# Patient Record
Sex: FEMALE | Race: WHITE | Employment: OTHER | URBAN - METROPOLITAN AREA
[De-identification: names, ages, dates, MRNs, and addresses within clinical notes are randomized per-mention and may not be internally consistent; named-entity substitution may affect disease eponyms.]

---

## 2017-08-29 ENCOUNTER — TRANSFERRED RECORDS (OUTPATIENT)
Dept: HEALTH INFORMATION MANAGEMENT | Facility: CLINIC | Age: 57
End: 2017-08-29

## 2017-09-03 ENCOUNTER — TRANSFERRED RECORDS (OUTPATIENT)
Dept: HEALTH INFORMATION MANAGEMENT | Facility: CLINIC | Age: 57
End: 2017-09-03

## 2017-09-06 ENCOUNTER — TRANSFERRED RECORDS (OUTPATIENT)
Dept: HEALTH INFORMATION MANAGEMENT | Facility: CLINIC | Age: 57
End: 2017-09-06

## 2017-09-09 ENCOUNTER — TRANSFERRED RECORDS (OUTPATIENT)
Dept: HEALTH INFORMATION MANAGEMENT | Facility: CLINIC | Age: 57
End: 2017-09-09

## 2017-09-10 ENCOUNTER — TRANSFERRED RECORDS (OUTPATIENT)
Dept: HEALTH INFORMATION MANAGEMENT | Facility: CLINIC | Age: 57
End: 2017-09-10

## 2017-09-11 ENCOUNTER — TRANSFERRED RECORDS (OUTPATIENT)
Dept: HEALTH INFORMATION MANAGEMENT | Facility: CLINIC | Age: 57
End: 2017-09-11

## 2017-09-12 ENCOUNTER — TRANSFERRED RECORDS (OUTPATIENT)
Dept: HEALTH INFORMATION MANAGEMENT | Facility: CLINIC | Age: 57
End: 2017-09-12

## 2017-09-13 ENCOUNTER — TRANSFERRED RECORDS (OUTPATIENT)
Dept: HEALTH INFORMATION MANAGEMENT | Facility: CLINIC | Age: 57
End: 2017-09-13

## 2017-09-14 ENCOUNTER — TRANSFERRED RECORDS (OUTPATIENT)
Dept: HEALTH INFORMATION MANAGEMENT | Facility: CLINIC | Age: 57
End: 2017-09-14

## 2017-09-15 ENCOUNTER — TRANSFERRED RECORDS (OUTPATIENT)
Dept: HEALTH INFORMATION MANAGEMENT | Facility: CLINIC | Age: 57
End: 2017-09-15

## 2017-09-16 ENCOUNTER — TRANSFERRED RECORDS (OUTPATIENT)
Dept: HEALTH INFORMATION MANAGEMENT | Facility: CLINIC | Age: 57
End: 2017-09-16

## 2017-09-18 ENCOUNTER — TRANSFERRED RECORDS (OUTPATIENT)
Dept: HEALTH INFORMATION MANAGEMENT | Facility: CLINIC | Age: 57
End: 2017-09-18

## 2018-01-27 ENCOUNTER — HEALTH MAINTENANCE LETTER (OUTPATIENT)
Age: 58
End: 2018-01-27

## 2018-01-29 ENCOUNTER — PRE VISIT (OUTPATIENT)
Dept: NEUROSURGERY | Facility: CLINIC | Age: 58
End: 2018-01-29

## 2018-01-29 NOTE — TELEPHONE ENCOUNTER
"NEUROSURGERY PRE-VISIT DATA  NEUROSURGERY PRE-VISIT DATA  ON THE PHONE CALL     Date of call   1/26/2018   Date of appointment   2/14/2018   Reason for referral (match appointment comment) Left Trigeminal Neuralgia a/w MS to thrive.   Who made the initial call (patient, parent [name], referral source     patient   Name of referring provider  Dr. Gertrudis Perez   Has the patient been seen for the referring problem?   If yes, by whom and where    yes/ [facility]   Where and what tests have been done?        Previous surgeries for the  referred condition\"?  If yes, by whom and where  *Request operative reports(s).      REQUEST MEDICAL RECORDS     From where?  [facility]   From whom (Specify Primary, Neurology, Neurosurgery, Orthopedics, ENT, DDS, Pain Mgt, Medical Oncologist, Radiation Oncologist)       Date of request ?    Who did you speak to?    Are records already in [facility]?     Have pertinent, outside records received?  (OR notes, study reports and provider notes)      IMAGES and TESTS:     Has the patient had any images done? yes   What images? (Specify MRI, CT, X-ray, Bone scan, MRA/MRV, etc)  * Include When and where?  MRI   What tests? (EMG, EEG, lumbar puncture, etc)  *Include when and where      Are the images in PACS?    If not already in PACS, have images been pushed to PAC and when?        "

## 2018-01-31 DIAGNOSIS — G35 MULTIPLE SCLEROSIS (H): ICD-10-CM

## 2018-01-31 DIAGNOSIS — G50.0 SECONDARY TRIGEMINAL NEURALGIA: Primary | ICD-10-CM

## 2018-02-14 ENCOUNTER — APPOINTMENT (OUTPATIENT)
Dept: SURGERY | Facility: CLINIC | Age: 58
End: 2018-02-14
Payer: COMMERCIAL

## 2018-02-14 ENCOUNTER — OFFICE VISIT (OUTPATIENT)
Dept: NEUROSURGERY | Facility: CLINIC | Age: 58
End: 2018-02-14
Payer: COMMERCIAL

## 2018-02-14 ENCOUNTER — ALLIED HEALTH/NURSE VISIT (OUTPATIENT)
Dept: SURGERY | Facility: CLINIC | Age: 58
End: 2018-02-14
Payer: COMMERCIAL

## 2018-02-14 ENCOUNTER — OFFICE VISIT (OUTPATIENT)
Dept: SURGERY | Facility: CLINIC | Age: 58
End: 2018-02-14
Payer: COMMERCIAL

## 2018-02-14 ENCOUNTER — ANESTHESIA EVENT (OUTPATIENT)
Dept: SURGERY | Facility: CLINIC | Age: 58
End: 2018-02-14
Payer: COMMERCIAL

## 2018-02-14 VITALS
WEIGHT: 118.1 LBS | OXYGEN SATURATION: 98 % | SYSTOLIC BLOOD PRESSURE: 111 MMHG | RESPIRATION RATE: 20 BRPM | BODY MASS INDEX: 18.54 KG/M2 | HEIGHT: 67 IN | TEMPERATURE: 97.9 F | DIASTOLIC BLOOD PRESSURE: 75 MMHG | HEART RATE: 92 BPM

## 2018-02-14 VITALS
WEIGHT: 118.1 LBS | BODY MASS INDEX: 18.54 KG/M2 | SYSTOLIC BLOOD PRESSURE: 111 MMHG | DIASTOLIC BLOOD PRESSURE: 75 MMHG | HEART RATE: 92 BPM | OXYGEN SATURATION: 98 % | HEIGHT: 67 IN | TEMPERATURE: 97.9 F | RESPIRATION RATE: 20 BRPM

## 2018-02-14 DIAGNOSIS — G50.0 TRIGEMINAL NEURALGIA: ICD-10-CM

## 2018-02-14 DIAGNOSIS — Z01.818 PREOPERATIVE GENERAL PHYSICAL EXAMINATION: Primary | ICD-10-CM

## 2018-02-14 DIAGNOSIS — G35 MULTIPLE SCLEROSIS (H): ICD-10-CM

## 2018-02-14 DIAGNOSIS — G50.0 SECONDARY TRIGEMINAL NEURALGIA: Primary | ICD-10-CM

## 2018-02-14 DIAGNOSIS — G35 MS (MULTIPLE SCLEROSIS) (H): ICD-10-CM

## 2018-02-14 RX ORDER — AMITRIPTYLINE HYDROCHLORIDE 50 MG/1
50 TABLET ORAL AT BEDTIME
COMMUNITY

## 2018-02-14 RX ORDER — GABAPENTIN 100 MG/1
100 CAPSULE ORAL 2 TIMES DAILY
COMMUNITY

## 2018-02-14 RX ORDER — LEVETIRACETAM 500 MG/1
500 TABLET ORAL 2 TIMES DAILY
COMMUNITY

## 2018-02-14 RX ORDER — AMITRIPTYLINE HYDROCHLORIDE 10 MG/1
10 TABLET ORAL AT BEDTIME
COMMUNITY

## 2018-02-14 RX ORDER — MEMANTINE HYDROCHLORIDE 10 MG/1
10 TABLET ORAL 2 TIMES DAILY
COMMUNITY

## 2018-02-14 RX ORDER — BACLOFEN 10 MG/1
10 TABLET ORAL 4 TIMES DAILY
COMMUNITY

## 2018-02-14 RX ORDER — VENLAFAXINE HYDROCHLORIDE 225 MG/1
225 TABLET, EXTENDED RELEASE ORAL
COMMUNITY

## 2018-02-14 ASSESSMENT — PAIN SCALES - GENERAL: PAINLEVEL: NO PAIN (0)

## 2018-02-14 NOTE — NURSING NOTE
Chief Complaint   Patient presents with     Consult     [facility]- LEFT SIDED FACIAL PAIN     David De Guzman, CMA

## 2018-02-14 NOTE — MR AVS SNAPSHOT
After Visit Summary   2018    Slime Valle    MRN: [id number]           Patient Information     Date Of Birth          1960        Visit Information        Provider Department      2018 2:30 PM Rn, [facility]        Care Instructions    Preparing for Your Surgery      Name:  Slime Valle   MRN:  [id number]   :  1960   Today's Date:  2018     Arriving for surgery:  Surgery date:  2-15-18  Arrival time:  05:30 a.m.  Please come to:       [facility] [address]  [address] [place]  [address]    -   parking is available in front of the hospital from 5:15 am to 8:00 pm    -  Stop at the Information Desk in the lobby    -   Inform the information person that you are here for surgery. An escort to 3c will be provided. If you would not like an escort, please proceed to 3C on the 3rd floor. [phone]     What can I eat or drink?  -  You may have solid food or milk products until 8 hours prior to your surgery  11:00 p.m.  -  You may have water, apple juice or 7up/Sprite until 2 hours prior to your surgery 05:30 a.m.    Which medicines can I take?  -  Do NOT take these medications in the morning, the day of surgery:     Vitamin D    -  Please take these medications the day of surgery:     Keppra, Gabapentin, Baclofen, Effexor, Namenda    How do I prepare myself?  -  Take two showers: one the night before surgery; and one the morning of surgery.         Use Scrubcare or Hibiclens to wash from neck down.  You may use your own shampoo and conditioner. No other hair products.   -  Do NOT use lotion, powder, deodorant, or antiperspirant the day of your surgery.  -  Do NOT wear any makeup, fingernail polish or jewelry.  -Do not bring your own medications to the hospital, except for inhalers and eye drops.  -  Bring your ID and insurance card.    Questions or Concerns:  If you have questions  or concerns, please call the  Preoperative Assessment Center, Monday-Friday 7AM-7PM:  [phone]  AFTER YOUR SURGERY  Breathing exercises   Breathing exercises help you recover faster. Take deep breaths and let the air out slowly. This will:     Help you wake up after surgery.    Help prevent complications like pneumonia.  Preventing complications will help you go home sooner.   We may give you a breathing device (incentive spirometer) to encourage you to breathe deeply.   Nausea and vomiting   You may feel sick to your stomach after surgery; if so, let your nurse know.    Pain control:  After surgery, you may have pain. Our goal is to help you manage your pain. Pain medicine will help you feel comfortable enough to do activities that will help you heal.  These activities may include breathing exercises, walking and physical therapy.   To help your health care team treat your pain we will ask: 1) If you have pain  2) where it is located 3) describe your pain in your words  Methods of pain control include medications given by mouth, vein or by nerve block for some surgeries.  We may give you a pain control pump that will:  1) Deliver the medicine through a tube placed in your vein  2) Control the amount of medicine you receive  3) Allow you to push a button to deliver a dose of pain medicine  Sequential Compression Device (SCD) or Pneumo Boots:  You may need to wear SCD S on your legs or feet. These are wraps connected to a machine that pumps in air and releases it. The repeated pumping helps prevent blood clots from forming.           Follow-ups after your visit        Your next 10 appointments already scheduled     Feb 14, 2018  2:30 PM CST   (Arrive by 2:15 PM)   PAC RN ASSESSMENT with Jasmin Pac Rn   [facility] ([facility])    [address] [place] [id number] [phone]            Feb 14, 2018  3:00 PM CST   (Arrive by 2:45 PM)    PAC Anesthesia Consult with  Pac Anesthesiologist   [facility] Pre[facility] ([facility])    [address]  [address] [facility] [id number] [phone]            2018  3:15 PM CST   LAB with  LAB   [facility] Lab ([facility])    [address]  [address] [facility] [id number] [phone]           Please do not eat 10-12 hours before your appointment if you are coming in fasting for labs on lipids, cholesterol, or glucose (sugar). This does not apply to pregnant women. Water, hot tea and black coffee (with nothing added) are okay. Do not drink other fluids, diet soda or chew gum.            Feb 15, 2018   Procedure with Gene Mcclelland MD   [facility] [place] Same Day Surgery (--)    [address] [id number] [phone]              Who to contact     Please call your clinic at [phone] to:    Ask questions about your health    Make or cancel appointments    Discuss your medicines    Learn about your test results    Speak to your doctor            Additional Information About Your Visit        [employer] Information     IMANINt is an electronic gateway that provides easy, online access to your medical records. With [employer], you can request a clinic appointment, read your test results, renew a prescription or communicate with your care team.     To sign up for [employer] visit the website at [email or web address]   You will be asked to enter the access code listed below, as well as some personal information. Please follow the directions to create your username and password.     Your access code is: [email or web address]  Expires: 2018 12:29 PM     Your access code will  in 90 days. If you need help or a new code, please contact your [facility] or call [phone] for assistance.        Care EveryWhere ID     This is your Care EveryWhere ID. This could be used  by other organizations to access your [facility] medical records  [id number]         Blood Pressure from Last 3 Encounters:   02/14/18 111/75   02/14/18 111/75    Weight from Last 3 Encounters:   02/14/18 53.6 kg (118 lb 1.6 oz)   02/14/18 53.6 kg (118 lb 1.6 oz)              Today, you had the following     No orders found for display       Primary Care Provider Office Phone # Fax #    Gertrudis GONZALEZ Chris, ZANE [phone]       [facility]  [address]  [facility] [address]        Equal Access to Services     [facility]: Hadii aad ku hadasho Sorosario, waaxda luqadaha, qaybta kaalmada aderenuyada, melva rodrigues . So [facility] [phone].    ATENCIÓN: Si habla español, tiene a hammonds disposición servicios gratuitos de asistencia lingüística. [facility] [phone].    We comply with applicable federal civil rights laws and [place] laws. We do not discriminate on the basis of race, color, national origin, age, disability, sex, sexual orientation, or gender identity.            Thank you!     Thank you for choosing [facility]  for your care. Our goal is always to provide you with excellent care. Hearing back from our patients is one way we can continue to improve our services. Please take a few minutes to complete the written survey that you may receive in the mail after your visit with us. Thank you!             Your Updated Medication List - Protect others around you: Learn how to safely use, store and throw away your medicines at www.disposemymeds.org.          This list is accurate as of 2/14/18  2:08 PM.  Always use your most recent med list.                   Brand Name Dispense Instructions for use Diagnosis    * amitriptyline 10 MG tablet    ELAVIL     Take 10 mg by mouth At Bedtime        * amitriptyline 50 MG tablet    ELAVIL     Take 50 mg by mouth At Bedtime        baclofen 10 MG tablet    LIORESAL     Take 10 mg by mouth 3 times  daily 20 mg in the morning, 10 mg at supper, and 10 mg at bedtime        cholecalciferol 1000 UNIT tablet    vitamin D3     Take 1,000 Units by mouth 2 times daily        gabapentin 100 MG capsule    NEURONTIN     Take 100 mg by mouth 2 times daily        Interferon Beta-1a 44 MCG/0.5ML Soaj      Inject 44 mcg into the muscle three times a week        KEPPRA 500 MG tablet   Generic drug:  levETIRAcetam      Take 500 mg by mouth 2 times daily        memantine 10 MG tablet    NAMENDA     Take 10 mg by mouth 2 times daily        PHENYTOIN PO      Take 300 mg by mouth At Bedtime        venlafaxine 225 MG Tb24 24 hr tablet    EFFEXOR-ER     Take 225 mg by mouth daily (with breakfast)        * Notice:  This list has 2 medication(s) that are the same as other medications prescribed for you. Read the directions carefully, and ask your doctor or other care provider to review them with you.

## 2018-02-14 NOTE — H&P
Pre-Operative H & P     CC:  Preoperative exam to assess for increased cardiopulmonary risk while undergoing surgery and anesthesia.    Date of Encounter: 2/14/2018  Primary Care Physician:  Gertrudis Perez  Slime Valle is a 57 year old female who presents for pre-operative H & P in preparation for undergo radiofrequency percutaneous left trigeminal rhizotomy, with Dr. Gene Mcclelland, on 2/15/18, at [facility], in treatment of trigeminal neuralgia. She is on multiple medications for pain, as well as steroids with poor response. In September a PEG tube was placed to facilitate nutrition due to difficulty with chewing.  She also has multiple sclerosis with complications of imbalance and uses a cane or walker.     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 Patient is postmenopausal.    Steroid use in the last year: yes for trigeminal neuralgia-predpack    Personal or FH with difficulty with Anesthesia:  Oversedation and hypoxia at OSH during intervention for pain control    Prior to Admission Medications  Current Outpatient Prescriptions   Medication Sig Dispense Refill     Interferon Beta-1a 44 MCG/0.5ML SOAJ Inject 44 mcg into the muscle three times a week       levETIRAcetam (KEPPRA) 500 MG tablet Take 500 mg by mouth 2 times daily       PHENYTOIN PO Take 300 mg by mouth At Bedtime        cholecalciferol (VITAMIN D3) 1000 UNIT tablet Take 1,000 Units by mouth 2 times daily        gabapentin (NEURONTIN) 100 MG capsule Take 100  mg by mouth 2 times daily       baclofen (LIORESAL) 10 MG tablet Take 10 mg by mouth 3 times daily 20 mg in the morning, 10 mg at supper, and 10 mg at bedtime       amitriptyline (ELAVIL) 10 MG tablet Take 10 mg by mouth At Bedtime       amitriptyline (ELAVIL) 50 MG tablet Take 50 mg by mouth At Bedtime       venlafaxine (EFFEXOR-ER) 225 MG TB24 24 hr tablet Take 225 mg by mouth daily (with breakfast)       memantine (NAMENDA) 10 MG tablet Take 10 mg by mouth 2 times daily         Allergies  Allergies   Allergen Reactions     Carbamazepine Other (See Comments)     Other reaction(s): Other (Specify in Comments)  Hallucinations, extreme weakness, confusion     Hydrocodone-Acetaminophen      Other reaction(s): Other (Specify in Comments)  Weakness, falls       Social History  Social History     Social History     Marital status:      Spouse name: Marvin     Number of children: 3 healthy     Years of education: N/A     Occupational History     Not on file.     Social History Main Topics     Smoking status: Never Smoker     Smokeless tobacco: Never Used     Alcohol use No     Drug use: No     Sexual activity: Not Currently     Partners: Male     Birth control/ protection: None     Other Topics Concern     Parent/Sibling W/ Cabg, Mi Or Angioplasty Before 65f 55m? No     Family History  Mother  2010: blood clot  Father: alive- no knowledge of his history      ROS/MED HX    ENT/Pulmonary:  - neg pulmonary ROS     Neurologic:     (+)other neuro relapsing remitting MS [id number] on interferon injections and  multiple other neuro/pain meds    Cardiovascular:  - neg cardiovascular ROS       METS/Exercise Tolerance:  3 - Able to walk 1-2 blocks without stopping   Hematologic:     (+) Anemia     Musculoskeletal:  - neg musculoskeletal ROS       GI/Hepatic:  - neg GI/hepatic ROS   (+) Other GI/Hepatic G-tube in past       Renal/Genitourinary:  - ROS Renal section negative       Endo:  - neg endo ROS       Psychiatric:   "   (+) psychiatric history depression      Infectious Disease:  - neg infectious disease ROS       Malignancy:      - no malignancy   Other:    (+) No chance of pregnancy no H/O Chronic Pain,no other significant disability          The complete review of systems is negative other than noted in the HPI or here.   Temp: 97.9  F (36.6  C) Temp src: Oral BP: 111/75 Pulse: 92   Resp: 20 SpO2: 98 %         118 lbs 1.6 oz  5' 7\"   Body mass index is 18.5 kg/(m^2).       Physical Exam  Constitutional: Awake, alert, cooperative, in apparent distress with wincing, holding head still and avoiding talking.  Appears stated age. Thin and frail appearance  Eyes: Pupils equal, round and reactive to light, extra ocular muscles intact, sclera clear, conjunctiva normal.  HENT: Normocephalic, oral pharynx with moist mucus membranes, inability to open mouth fully. No goiter appreciated.   Respiratory: Clear to auscultation bilaterally, no crackles or wheezing.  Cardiovascular: Regular rate and rhythm, normal S1 and S2, and no murmur noted.  Carotids +2, no bruits. No LE edema. Palpable pulses to radial  DP and PT arteries.   GI: Normal bowel sounds, soft, non-distended, non-tender, no masses palpated, no hepatosplenomegaly.  Surgical scars: left upper quad  Lymph/Hematologic: No cervical lymphadenopathy and no supraclavicular lymphadenopathy.   Genitourinary:  deferred  Skin: Warm and dry.  No rashes at anticipated surgical site.   Musculoskeletal: Loss muscle mass supraclavicular area. Full ROM of neck. There is no redness, warmth, or swelling of the joints. Gross motor strength is not tested.  Neurologic: Awake, alert, oriented to name, place and time. Cranial nerves II-XII are grossly intact. Gait is staggered and requires assistance for balance.   Neuropsychiatric: Calm, cooperative. Gratiot affect.     Labs: (personally reviewed) from 9/18/17  CBC OSH: wbc 6.3, HGB 10.8, platelets 308,000  BMP: NA+ 140, K+ 3.5, Cr 0.4, glucose " 96  INR 1.0  EKG: Not indicated    Outside records reviewed from: [facility]    ASSESSMENT and PLAN  Slime Valle is a 57 year old female scheduled to undergo radiofrequency percutaneous left trigeminal rhizotomy, with Dr. Gene Mcclelland, on 2/15/18, in treatment of trigeminal neuralgia.     Pre-operative considerations include:  1.) Cardiac: No known cardiac dx, sx or meds.  Exercise tolerance < 4 METS due to imbalance from multiple sclerosis. Needs assist for ambulation - walker or cane. Risks: HLD.   RCRI = 0.4% risk of adverse cardiac event  No further cardiac evaluation indicated    2.) Pulmonary: Her  gives hx of central sleep apnea diagnosed during admission of pain control and what sounds like overdose of pain medication, including the use of Fentanyl.     3.) GI/Heme: FTT due to pain with eating; PEG placed 9/6/17 (now closed) with improvement. Anemia. Last HGB September 10.8. No iron supplementation. Currently tolerating oral nutrition/ Boost.     4.) Neuro: relapsing remitting multiple sclerosis. RLS. Sx of imbalance. Depression. Management on baclofen, amitriptyline, neurontin, keppra, dilantin, interferon, effexor and amenda  -May take all of pain meds DOS    .. She has the following specific operative considerations:   - RCRI : No serious cardiac risks.  0.4% risk of major adverse cardiac event.   - Anesthesia considerations:  Refer to PAC assessment in anesthesia records  - ESAU # of risks 1/8 = low  - Risk of PONV score = 2.  If > 2, anti-emetic intervention recommended.      Patient was discussed with Dr Sol. Patient is optimized and is acceptable candidate for the proposed procedure.  No further diagnostic evaluation is needed.     JACKI Hartman  Preoperative Assessment Center  [facility]  Clinic and Surgery Center  Phone: [phone]  Fax: [phone]

## 2018-02-14 NOTE — LETTER
2/14/2018       RE: Slime Valle  [address] [place] [place] [address]     Dear Colleague,    Thank you for referring your patient, Slime Valle, to the [facility] NEUROSURGERY at [facility]. Please see a copy of my visit note below.    Neurosurgery Consultation    Slime Valle MRN# [id number]   YOB: 1960 Age: 57 year old   Date of Service: 02/14/18     Provider Requesting Consultation:   Gertrudis Perez NP  [facility]  [address]  [place] [address]    Dear Ms. Perez,    We had the pleasure of seeing Slime Valle and her  in the office at your request in consultation regarding her intractable well established secondary left sided trigeminal neuralgia (TN) associated with multiple sclerosis (MS).    This is a 57 year old female with a history of MS diagnosed in 1996 (on Rebif) and associated mild left sided weakness and right sided trigeminal neuralgia. She initially developed paroxysmal lancinating pains in the left V2 and V3 distributions in 12/2014. She denies constant background aching pain, dysesthesia, facial numbness and pain in the contralateral side of her face. Triggers include talking, eating, cold temperature, and touching face, although she gets the pain attacks sporadically as well.  Her trigeminal neuralgia pain was under reasonable control by Elavil until Summer of 2017. Since then, the pain has been unremitting to additional medications including Neurontin, Keppra, and Dilantin. In 9/2017, she was admitted to a local hospital and had to have a PEG tube placed for malnutrition secondary to malignant trigeminal neuralgia attacks. She had the first trigeminal nerve block on 9/14/2017 which in combination with all medications rendered about 4 months of complete pain relief. She then underwent a 2nd trigeminal nerve block on 1/13/2018 for recurrence that offered only one week of pain relief. She  no longer requires PEG tube for oral intake. The pain however, is progressing steadily despite increased dosage of medications by her neurologist. She is here to seek surgical intervention at this point.             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           Past Surgical History:   Denies.           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 Prior this, worked as a  in [place] for many years     Social History Main Topics     Smoking status: Never Smoker     Smokeless tobacco: Never Used     Alcohol use No     Drug use: No     Sexual activity: Not Currently     Partners: Male     Birth control/ protection: None     Other Topics Concern     Parent/Sibling W/ Cabg, Mi Or Angioplasty Before 65f 55m? No     Social History Narrative    Enjoys sudoku and quilting previously.             Family History:   No family history on file.            Allergies:     Allergies   Allergen Reactions     Carbamazepine Other (See Comments)     Other reaction(s): Other (Specify in Comments)  Hallucinations, extreme weakness, confusion     Hydrocodone-Acetaminophen      Other reaction(s): Other (Specify in Comments)  Weakness, falls              Medications:     Current Outpatient Prescriptions:      Interferon Beta-1a 44 MCG/0.5ML SOAJ, Inject 44 mcg into the muscle three times a week, Disp: , Rfl:      levETIRAcetam (KEPPRA) 500 MG tablet, Take 500 mg by mouth 2 times daily, Disp: , Rfl:      PHENYTOIN PO, Take 300 mg by mouth At Bedtime , Disp: , Rfl:      cholecalciferol (VITAMIN D3) 1000 UNIT tablet, Take 1,000  "Units by mouth 2 times daily , Disp: , Rfl:      gabapentin (NEURONTIN) 100 MG capsule, Take 100 mg by mouth 2 times daily, Disp: , Rfl:      baclofen (LIORESAL) 10 MG tablet, Take 10 mg by mouth 3 times daily 20 mg in the morning, 10 mg at supper, and 10 mg at bedtime, Disp: , Rfl:      amitriptyline (ELAVIL) 10 MG tablet, Take 10 mg by mouth At Bedtime, Disp: , Rfl:      amitriptyline (ELAVIL) 50 MG tablet, Take 50 mg by mouth At Bedtime, Disp: , Rfl:      venlafaxine (EFFEXOR-ER) 225 MG TB24 24 hr tablet, Take 225 mg by mouth daily (with breakfast), Disp: , Rfl:      memantine (NAMENDA) 10 MG tablet, Take 10 mg by mouth 2 times daily, Disp: , Rfl:               Review of Systems:   Her 10 point review of system is as stated above in HPI and PMH. Remaining system is otherwise negative.           Physical Exam:    /75  Pulse 92  Temp 97.9  F (36.6  C) (Oral)  Resp 20  Ht 1.702 m (5' 7\")  Wt 53.6 kg (118 lb 1.6 oz)  SpO2 98%  Breastfeeding? No  BMI 18.5 kg/m2  HEENT: Head is normocephalic and atraumatic. Neck is supple without adenopathy or thyromegaly. Palpation of her bilateral temporomandibular joint shows no popping, clicking, tenderness and/or swelling. Conjunctivae are anicteric. Oropharynx and nasopharynx are without exudate and/or erythema. Inspection of her oral cavity shows no obvious ulcerations and/or lesions.   Lungs: Clear to auscultation.   Cardiovascular: Heart rate is regular with S1, S2 and no extra sounds. Peripheral pulses are +2 throughout. She has no JVD.        Focused Neurologic Exam:   She is alert, oriented and cooperative. She answers questions and follows commands appropriately in fluent speech and normal tone. Memory and fund of knowledge are normal. Bilateral pupils are round, equal and reactive to light and accommodation. She has lateral nystagmus; no disconjugation. Visual fields to direct confrontation are full. Corneal reflexes are positive bilaterally. Masseter and " temporalis muscle strength is normal. Facial sensation and expression are symmetric. Hearing is to his/her baseline bilaterally. Gag reflex is present. Tongue and uvula are midline with normal palate movements and no fasciculation. Trapezius and sternocleidal strength is equivocal. She has no finger-to-nose dysmetria. Romberg test is negative.   Strength in bilateral upper and right lower extremities is 5/5. Strength in left lower extremity is 5/5 with hip flexion, quads and hamstring.She wears a leg brace in left leg for foot drop. Sensation to light touch is intact in 4 extremities. Deep tendon reflexes are symmetric. Finger-nose-finger, heel-to-shin rub and rapid alternating finger and hand taps are normal. Tandem walk was not tested.  Gait is wide base and slightly unsteady.            Assessment and Recommendation:   Intractable left sided secondary trigeminal neuralgia (TN) in V2 and V3 distributions associated with multiple sclerosis (MS)     We told the patient and her  that it is reasonable to consider surgical intervention when TN is refractory to pharmacologic management.    We went over several percutaneous procedures such as radiofrequency rhizotomy, balloon compression and glycerol rhizolysis that are more frequently offered for MS- related TN. The treatments work by deliberately injuring the trigeminal nerve to disrupt the pain signals travelling along it. We also told her that these are all outpatient procedures which can be repeated for recurrence.The patient was informed that the initial success rate from radiofrequency is about 80% and 70% for balloon compression with a medium term recurrence rate ranging between 2 to 3 years. Potential complications associated with the procedure include, but are not limited to hypethesia, dysesthesia (with 0.2- 4 percent of anesthesia dolorosa), bleeding and infection.       They also asked a number of good questions, which we answered today. She would  like to move forward with the radiofrequency rhizotomy. We were able to have this set up for tomorrow 2/15 and a pre-op H&P with PAC today.    Thank you very much for allowing us to participate in the care of this patient. Please do not hesitate to contact us with questions. We will keep you informed of her progress.     70% of the 60 minutes visit today I spent counseling the patient on surgical options for her multiple sclerosis-related trigeminal neuralgia and coordination of care.    The patient was seen in conjunction with attending Dr. Mcclelland. Dr. Mcclelland reviewed the history, examined the patient, and jointly developed the plan of care.    I have personally reviewed the history and images, examined the patient and discussed the findings with Ms. Maritierra and the patient, reviewed and edited Ms. Oreilly's note and agree with the plan of care. - [facility]    Service Date: 2018      HISTORY OF PRESENT ILLNESS:  Ms. Bush is seen today in preparation for a left radiofrequency rhizotomy to treat trigeminal neuralgia secondary to multiple sclerosis.  A detailed summary of today's visit has been entered into the record by my nurse practitioner, Bhavani Oreilly, which I have reviewed, amended and incorporated into my own.      ASSESSMENT AND PLAN:  I discussed the procedure with Ms. Bush and her  and explained the various steps that would be taken to accomplish a safe rhizotomy.  We talked about risks including meningitis, hemorrhage and even stroke and death.  I indicated that these complications are very uncommon.  They asked a number of excellent questions, which I answered and seemed to have a clear understanding of the procedure and its potential risks.  We are scheduled to perform the procedure tomorrow.     D: 2018   T: 02/15/2018   MT: SOLITARIO      Name:     JOHANN BUSH   MRN:      [id number]        Account:      [id number]   :      1960           Service Date: 2018       Document: [id number]         Gene Mcclelland MD

## 2018-02-14 NOTE — MR AVS SNAPSHOT
After Visit Summary   2018    Slime Valle    MRN: [id number]           Patient Information     Date Of Birth          1960        Visit Information        Provider Department      2018 11:00 AM Gene Mcclelland MD [facility] Neurosurgery        Today's Diagnoses     Secondary trigeminal neuralgia    -  1    MS (multiple sclerosis) (H)          Care Instructions    1. Proceed with PAC visit today and surgery at [facility] on 2/15  2. Call to make a 2 week followup appointment after the surgery.  3. Call [phone] for concerns and questions after the surgery.            Follow-ups after your visit        Follow-up notes from your care team     Return in about 2 weeks (around 2018).      Who to contact     Please call your clinic at [phone] to:    Ask questions about your health    Make or cancel appointments    Discuss your medicines    Learn about your test results    Speak to your doctor            Additional Information About Your Visit        MyChart Information     [employer] Mobilet is an electronic gateway that provides easy, online access to your medical records. With Mobvoi, you can request a clinic appointment, read your test results, renew a prescription or communicate with your care team.     To sign up for Visage Mobilet visit the website at [email or web address]   You will be asked to enter the access code listed below, as well as some personal information. Please follow the directions to create your username and password.     Your access code is: [email or web address]  Expires: 2018 12:29 PM     Your access code will  in 90 days. If you need help or a new code, please contact your [facility] or call [phone] for assistance.        Care EveryWhere ID     This is your Care EveryWhere ID. This could be used by other organizations to access your [place] medical records  [id number]        Your Vitals Were     Pulse Temperature  "Respirations Height Pulse Oximetry Breastfeeding?    92 97.9  F (36.6  C) (Oral) 20 1.702 m (5' 7\") 98% No    BMI (Body Mass Index)                   18.5 kg/m2            Blood Pressure from Last 3 Encounters:   02/15/18 117/78   02/14/18 111/75   02/14/18 111/75    Weight from Last 3 Encounters:   02/15/18 52.7 kg (116 lb 2.9 oz)   02/14/18 53.6 kg (118 lb 1.6 oz)   02/14/18 53.6 kg (118 lb 1.6 oz)              Today, you had the following     No orders found for display       Primary Care Provider Office Phone # Fax #    Gertrudis LISA Perez [phone]       [facility]  [address]  [facility] [address]        Equal Access to Services     [facility]: Hadii aad ku hadasho Sorosario, waaxda luqadaha, qaybta kaalmada aderenuyada, melva rodrigues . So [facility] [phone].    ATENCIÓN: Si habla español, tiene a hammonds disposición servicios gratuitos de asistencia lingüística. Judi al [phone].    We comply with applicable federal civil rights laws and [place] laws. We do not discriminate on the basis of race, color, national origin, age, disability, sex, sexual orientation, or gender identity.            Thank you!     Thank you for choosing [facility]  for your care. Our goal is always to provide you with excellent care. Hearing back from our patients is one way we can continue to improve our services. Please take a few minutes to complete the written survey that you may receive in the mail after your visit with us. Thank you!             Your Updated Medication List - Protect others around you: Learn how to safely use, store and throw away your medicines at www.disposemymeds.org.          This list is accurate as of 2/14/18 11:59 PM.  Always use your most recent med list.                   Brand Name Dispense Instructions for use Diagnosis    * amitriptyline 10 MG tablet    ELAVIL     Take 10 mg by mouth At Bedtime        * amitriptyline 50 MG " tablet    ELAVIL     Take 50 mg by mouth At Bedtime        baclofen 10 MG tablet    LIORESAL     Take 10 mg by mouth 3 times daily 20 mg in the morning, 10 mg at supper, and 10 mg at bedtime        cholecalciferol 1000 UNIT tablet    vitamin D3     Take 1,000 Units by mouth 2 times daily        gabapentin 100 MG capsule    NEURONTIN     Take 100 mg by mouth 2 times daily        Interferon Beta-1a 44 MCG/0.5ML Soaj      Inject 44 mcg into the muscle three times a week        KEPPRA 500 MG tablet   Generic drug:  levETIRAcetam      Take 500 mg by mouth 2 times daily        memantine 10 MG tablet    NAMENDA     Take 10 mg by mouth 2 times daily        PHENYTOIN PO      Take 300 mg by mouth At Bedtime        venlafaxine 225 MG Tb24 24 hr tablet    EFFEXOR-ER     Take 225 mg by mouth daily (with breakfast)        * Notice:  This list has 2 medication(s) that are the same as other medications prescribed for you. Read the directions carefully, and ask your doctor or other care provider to review them with you.

## 2018-02-14 NOTE — PROGRESS NOTES
Neurosurgery Consultation    Slime Valle MRN# [id number]   YOB: 1960 Age: 57 year old   Date of Service: 02/14/18     Provider Requesting Consultation:   Gertrudis Perez NP  [facility]  [address]  [place] [address]    Dear Ms. Perez,    We had the pleasure of seeing Slime Valle and her  in the office at your request in consultation regarding her intractable well established secondary left sided trigeminal neuralgia (TN) associated with multiple sclerosis (MS).    This is a 57 year old female with a history of MS diagnosed in 1996 (on Rebif) and associated mild left sided weakness and right sided trigeminal neuralgia. She initially developed paroxysmal lancinating pains in the left V2 and V3 distributions in 12/2014. She denies constant background aching pain, dysesthesia, facial numbness and pain in the contralateral side of her face. Triggers include talking, eating, cold temperature, and touching face, although she gets the pain attacks sporadically as well.  Her trigeminal neuralgia pain was under reasonable control by Elavil until Summer of 2017. Since then, the pain has been unremitting to additional medications including Neurontin, Keppra, and Dilantin. In 9/2017, she was admitted to a local hospital and had to have a PEG tube placed for malnutrition secondary to malignant trigeminal neuralgia attacks. She had the first trigeminal nerve block on 9/14/2017 which in combination with all medications rendered about 4 months of complete pain relief. She then underwent a 2nd trigeminal nerve block on 1/13/2018 for recurrence that offered only one week of pain relief. She no longer requires PEG tube for oral intake. The pain however, is progressing steadily despite increased dosage of medications by her neurologist. She is here to seek surgical intervention at this point.             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           Past Surgical History:   Denies.           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 Prior this, worked as a  in [employer] for many years     Social History Main Topics     Smoking status: Never Smoker     Smokeless tobacco: Never Used     Alcohol use No     Drug use: No     Sexual activity: Not Currently     Partners: Male     Birth control/ protection: None     Other Topics Concern     Parent/Sibling W/ Cabg, Mi Or Angioplasty Before 65f 55m? No     Social History Narrative    Enjoys sudoku and quilting previously.             Family History:   No family history on file.            Allergies:     Allergies   Allergen Reactions     Carbamazepine Other (See Comments)     Other reaction(s): Other (Specify in Comments)  Hallucinations, extreme weakness, confusion     Hydrocodone-Acetaminophen      Other reaction(s): Other (Specify in Comments)  Weakness, falls              Medications:     Current Outpatient Prescriptions:      Interferon Beta-1a 44 MCG/0.5ML SOAJ, Inject 44 mcg into the muscle three times a week, Disp: , Rfl:      levETIRAcetam (KEPPRA) 500 MG tablet, Take 500 mg by mouth 2 times daily, Disp: , Rfl:      PHENYTOIN PO, Take 300 mg by mouth At Bedtime , Disp: , Rfl:      cholecalciferol (VITAMIN D3) 1000 UNIT tablet, Take 1,000 Units by mouth 2 times daily , Disp: , Rfl:      gabapentin (NEURONTIN) 100 MG capsule, Take 100 mg by mouth 2 times daily, Disp: , Rfl:      baclofen (LIORESAL) 10 MG tablet, Take 10 mg by mouth 3 times daily 20 mg in the morning, 10 mg at supper, and 10 mg at bedtime, Disp: , Rfl:       "amitriptyline (ELAVIL) 10 MG tablet, Take 10 mg by mouth At Bedtime, Disp: , Rfl:      amitriptyline (ELAVIL) 50 MG tablet, Take 50 mg by mouth At Bedtime, Disp: , Rfl:      venlafaxine (EFFEXOR-ER) 225 MG TB24 24 hr tablet, Take 225 mg by mouth daily (with breakfast), Disp: , Rfl:      memantine (NAMENDA) 10 MG tablet, Take 10 mg by mouth 2 times daily, Disp: , Rfl:               Review of Systems:   Her 10 point review of system is as stated above in HPI and PMH. Remaining system is otherwise negative.           Physical Exam:    /75  Pulse 92  Temp 97.9  F (36.6  C) (Oral)  Resp 20  Ht 1.702 m (5' 7\")  Wt 53.6 kg (118 lb 1.6 oz)  SpO2 98%  Breastfeeding? No  BMI 18.5 kg/m2  HEENT: Head is normocephalic and atraumatic. Neck is supple without adenopathy or thyromegaly. Palpation of her bilateral temporomandibular joint shows no popping, clicking, tenderness and/or swelling. Conjunctivae are anicteric. Oropharynx and nasopharynx are without exudate and/or erythema. Inspection of her oral cavity shows no obvious ulcerations and/or lesions.   Lungs: Clear to auscultation.   Cardiovascular: Heart rate is regular with S1, S2 and no extra sounds. Peripheral pulses are +2 throughout. She has no JVD.        Focused Neurologic Exam:   She is alert, oriented and cooperative. She answers questions and follows commands appropriately in fluent speech and normal tone. Memory and fund of knowledge are normal. Bilateral pupils are round, equal and reactive to light and accommodation. She has lateral nystagmus; no disconjugation. Visual fields to direct confrontation are full. Corneal reflexes are positive bilaterally. Masseter and temporalis muscle strength is normal. Facial sensation and expression are symmetric. Hearing is to his/her baseline bilaterally. Gag reflex is present. Tongue and uvula are midline with normal palate movements and no fasciculation. Trapezius and sternocleidal strength is equivocal. She has " no finger-to-nose dysmetria. Romberg test is negative.   Strength in bilateral upper and right lower extremities is 5/5. Strength in left lower extremity is 5/5 with hip flexion, quads and hamstring.She wears a leg brace in left leg for foot drop. Sensation to light touch is intact in 4 extremities. Deep tendon reflexes are symmetric. Finger-nose-finger, heel-to-shin rub and rapid alternating finger and hand taps are normal. Tandem walk was not tested.  Gait is wide base and slightly unsteady.            Assessment and Recommendation:   Intractable left sided secondary trigeminal neuralgia (TN) in V2 and V3 distributions associated with multiple sclerosis (MS)     We told the patient and her  that it is reasonable to consider surgical intervention when TN is refractory to pharmacologic management.    We went over several percutaneous procedures such as radiofrequency rhizotomy, balloon compression and glycerol rhizolysis that are more frequently offered for MS- related TN. The treatments work by deliberately injuring the trigeminal nerve to disrupt the pain signals travelling along it. We also told her that these are all outpatient procedures which can be repeated for recurrence.The patient was informed that the initial success rate from radiofrequency is about 80% and 70% for balloon compression with a medium term recurrence rate ranging between 2 to 3 years. Potential complications associated with the procedure include, but are not limited to hypethesia, dysesthesia (with 0.2- 4 percent of anesthesia dolorosa), bleeding and infection.       They also asked a number of good questions, which we answered today. She would like to move forward with the radiofrequency rhizotomy. We were able to have this set up for tomorrow 2/15 and a pre-op H&P with PAC today.    Thank you very much for allowing us to participate in the care of this patient. Please do not hesitate to contact us with questions. We will keep you  informed of her progress.     70% of the 60 minutes visit today I spent counseling the patient on surgical options for her multiple sclerosis-related trigeminal neuralgia and coordination of care.    The patient was seen in conjunction with attending Dr. Mcclelland. Dr. Mcclelland reviewed the history, examined the patient, and jointly developed the plan of care.      Gene Mcclelland MD, Professor Bhavani Oreilly, DNP, APRN, FNP-[facility]  Department of Neurosurgery  Phone: [phone]  Fax: [phone]    I have personally reviewed the history and images, examined the patient and discussed the findings with Ms. Oreilly and the patient, reviewed and edited Ms. Oreilly's note and agree with the plan of care. - [facility]

## 2018-02-14 NOTE — PATIENT INSTRUCTIONS
1. Proceed with PAC visit today and surgery at [facility] on 2/15  2. Call to make a 2 week followup appointment after the surgery.  3. Call [phone] for concerns and questions after the surgery.

## 2018-02-14 NOTE — PATIENT INSTRUCTIONS
Preparing for Your Surgery      Name:  Slime Valle   MRN:  [id number]   :  1960   Today's Date:  2018     Arriving for surgery:  Surgery date:  2-15-18  Arrival time:  05:30 a.m.  Please come to:       [facility] [address]  [address] [place]  [address]    -   parking is available in front of the hospital from 5:15 am to 8:00 pm    -  Stop at the Information Desk in the lobby    -   Inform the information person that you are here for surgery. An escort to 3c will be provided. If you would not like an escort, please proceed to 3C on the 3rd floor. [phone]     What can I eat or drink?  -  You may have solid food or milk products until 8 hours prior to your surgery  11:00 p.m.  -  You may have water, apple juice or 7up/Sprite until 2 hours prior to your surgery 05:30 a.m.    Which medicines can I take?  -  Do NOT take these medications in the morning, the day of surgery:     Vitamin D    -  Please take these medications the day of surgery:     Keppra, Gabapentin, Baclofen, Effexor, Namenda    How do I prepare myself?  -  Take two showers: one the night before surgery; and one the morning of surgery.         Use Scrubcare or Hibiclens to wash from neck down.  You may use your own shampoo and conditioner. No other hair products.   -  Do NOT use lotion, powder, deodorant, or antiperspirant the day of your surgery.  -  Do NOT wear any makeup, fingernail polish or jewelry.  -Do not bring your own medications to the hospital, except for inhalers and eye drops.  -  Bring your ID and insurance card.    Questions or Concerns:  If you have questions or concerns, please call the  Preoperative Assessment Center, Monday-Friday 7AM-7PM:  [phone]  AFTER YOUR SURGERY  Breathing exercises   Breathing exercises help you recover faster. Take deep breaths and let the air out slowly. This will:     Help you wake up after surgery.    Help prevent complications  like pneumonia.  Preventing complications will help you go home sooner.   We may give you a breathing device (incentive spirometer) to encourage you to breathe deeply.   Nausea and vomiting   You may feel sick to your stomach after surgery; if so, let your nurse know.    Pain control:  After surgery, you may have pain. Our goal is to help you manage your pain. Pain medicine will help you feel comfortable enough to do activities that will help you heal.  These activities may include breathing exercises, walking and physical therapy.   To help your health care team treat your pain we will ask: 1) If you have pain  2) where it is located 3) describe your pain in your words  Methods of pain control include medications given by mouth, vein or by nerve block for some surgeries.  We may give you a pain control pump that will:  1) Deliver the medicine through a tube placed in your vein  2) Control the amount of medicine you receive  3) Allow you to push a button to deliver a dose of pain medicine  Sequential Compression Device (SCD) or Pneumo Boots:  You may need to wear SCD S on your legs or feet. These are wraps connected to a machine that pumps in air and releases it. The repeated pumping helps prevent blood clots from forming.

## 2018-02-14 NOTE — NURSING NOTE
Pre-op Teaching    Procedure:Radiofrequency Percutaneous Left Trigeminal Rhizotomy  Planned Surgery Date:2/15/18  Surgeon:Krystin                Discussed pre-op routine and requirements to include:  surgical procedure, post-op recovery and expectations, need for H&P, NPO prior to OR, pre-op antibacterial showers, pain control and importance of follow-up visits.  Surgery scheduling will coordinate OR time/date and update patient as appropriate.  3C will call to re-inforce instructions 24-48 hours prior to surgery.       Ample time was provided for patient questions and in-depth discussion of topics of heightened interest.  Reviewed medication list and provided instructions regarding what medications to stop prior to surgery.   Anti-bacterial soap solution for pre-op showers will be provided at PAC visit as well as specific instructions for use. Approximately 20 minutes spent with patient/family discussing and reviewing.      Patient provided triage contact number for questions or concerns that may arise prior to surgery. Pre-op folder with specific written instructions given to patient for review.

## 2018-02-14 NOTE — ANESTHESIA PREPROCEDURE EVALUATION
Anesthesia Evaluation     . Pt has had prior anesthetic. Type: MAC and Regional           ROS/MED HX    ENT/Pulmonary:  - neg pulmonary ROS     Neurologic:     (+)other neuro relapsom remitting MS 1985 on interferon injections and baclofen and multiple other pain meds    Cardiovascular:  - neg cardiovascular ROS       METS/Exercise Tolerance:  3 - Able to walk 1-2 blocks without stopping   Hematologic:     (+) Anemia, -      Musculoskeletal:  - neg musculoskeletal ROS       GI/Hepatic:  - neg GI/hepatic ROS   (+) Other GI/Hepatic G-tube in past       Renal/Genitourinary:  - ROS Renal section negative       Endo:  - neg endo ROS       Psychiatric:     (+) psychiatric history depression      Infectious Disease:  - neg infectious disease ROS       Malignancy:      - no malignancy   Other:    (+) No chance of pregnancy no H/O Chronic Pain,no other significant disability                    Physical Exam  Normal systems: dental    Airway   Mallampati: IV  TM distance: >3 FB  Neck ROM: limited  Comment: Unable to fully open mouth during any part of exam due to pain    Dental     Cardiovascular   Rhythm and rate: regular and normal      Pulmonary    breath sounds clear to auscultation    Other findings: LABS: OSH  CBC 9/18/18: wbc 6.3; HGB 10.8, platelets 308,000  BMP: NA+ 140, K+ 3.5, Creatinine 0.4, glucose 96           PAC Discussion and Assessment    ASA Classification: 3  Case is suitable for:   Anesthetic techniques and relevant risks discussed: MAC with GA as backup  Invasive monitoring and risk discussed: No  Types:   Possibility and Risk of blood transfusion discussed:   NPO instructions given:   Additional anesthetic preparation and risks discussed:   Needs early admission to pre-op area:   Other:     PAC Resident/NP Anesthesia Assessment:  57 year old for radiofrequency percutaneous left trigeminal rhizotomy, with Dr. Gene Mcclelland, on 2/15/18, in treatment of trigeminal neuralgia. PAC referral for risk  assessment and optimization for anesthesia.   Pre-operative considerations include:  1.) Cardiac: No known cardiac dx, sx or meds.  Exercise tolerance < 4 METS due to imbalance from multiple sclerosis. Needs assist for ambulation - walker or cane. Risks: HLD.   RCRI = 0.4% risk of adverse cardiac event  No further cardiac evaluation indicated  2.) Pulmonary: Her  gives hx of central sleep apnea diagnosed during admission of pain control and what sounds like overdose of pain medication, including the use of Fentanyl.   ESAU risks = age only 1/8 = low risk  3.) GI/Heme: FTT due to pain with eating; PEG placed 9/6/17 (now closed) with improvement. Currently oral nutrition/ Boost. Anemia. Last HGB September 10.8. No iron supplementation.  4.) Neuro: relapsing remitting multiple sclerosis. RLS. Sx of imbalance. Depression. Management on baclofen, amitriptyline, neurontin, keppra, dilantin, interferon, effexor and amenda.  Anesthesia: MAC for PEG tube - issues with oversedation and hypoxia, as discussed above.  Pt also seen by Dr. Sol.      Reviewed and Signed by PAC Mid-Level Provider/Resident  Mid-Level Provider/Resident: Rosario Pérez PA-C  Date: 2/14/18  Time: 2:18 PM    Attending Anesthesiologist Anesthesia Assessment:  57 year old for percutaneous left trigeminal rhizotomy. Chart reviewed, patient seen and evaluated; agree with above assessment. Vague history of respiratory depression that  calls central sleep apnea, but which sounds more like just over sedation with fentanyl. Could consider using ultrashort acting agents (joan) and avoid fentanyl altogether, but doubt that there is any real drug sensitivity or adverse reaction.    Patient is appropriate for the planned procedure without further workup or medical management change. The final anesthesia plan will be determined by the physician anesthesiologist caring for the patient on the day of surgery.      Reviewed and Signed by PAC  Anesthesiologist  Anesthesiologist: norm  Date: 2/14/2018  Time:   Pass/Fail: Pass  Disposition:     PAC Pharmacist Assessment:        Pharmacist:   Date:   Time:      Anesthesia Plan      History & Physical Review      ASA Status:  3 .    NPO Status:  > 8 hours    Plan for MAC with Intravenous induction. Reason for MAC:  Deep or markedly invasive procedure (G8) and Procedure to face, neck, head or breast  PONV prophylaxis:  Ondansetron (or other 5HT-3)       Postoperative Care  Postoperative pain management:  IV analgesics and Oral pain medications.      Consents  Anesthetic plan, risks, benefits and alternatives discussed with:  Spouse and Patient..                          .

## 2018-02-15 ENCOUNTER — HOSPITAL ENCOUNTER (OUTPATIENT)
Facility: CLINIC | Age: 58
Discharge: HOME | End: 2018-02-15
Attending: NEUROLOGICAL SURGERY | Admitting: NEUROLOGICAL SURGERY
Payer: COMMERCIAL

## 2018-02-15 ENCOUNTER — APPOINTMENT (OUTPATIENT)
Dept: GENERAL RADIOLOGY | Facility: CLINIC | Age: 58
End: 2018-02-15
Attending: NEUROLOGICAL SURGERY
Payer: COMMERCIAL

## 2018-02-15 ENCOUNTER — ANESTHESIA (OUTPATIENT)
Dept: SURGERY | Facility: CLINIC | Age: 58
End: 2018-02-15
Payer: COMMERCIAL

## 2018-02-15 VITALS
DIASTOLIC BLOOD PRESSURE: 78 MMHG | OXYGEN SATURATION: 95 % | BODY MASS INDEX: 17.61 KG/M2 | RESPIRATION RATE: 16 BRPM | TEMPERATURE: 97.8 F | WEIGHT: 116.18 LBS | SYSTOLIC BLOOD PRESSURE: 117 MMHG | HEIGHT: 68 IN

## 2018-02-15 LAB — GLUCOSE BLDC GLUCOMTR-MCNC: 103 MG/DL (ref 70–99)

## 2018-02-15 PROCEDURE — 27210794 ZZH OR GENERAL SUPPLY STERILE: Performed by: NEUROLOGICAL SURGERY

## 2018-02-15 PROCEDURE — 25000125 ZZHC RX 250: Performed by: NEUROLOGICAL SURGERY

## 2018-02-15 PROCEDURE — 25000125 ZZHC RX 250: Performed by: STUDENT IN AN ORGANIZED HEALTH CARE EDUCATION/TRAINING PROGRAM

## 2018-02-15 PROCEDURE — 36000059 ZZH SURGERY LEVEL 3 EA 15 ADDTL MIN UMMC: Performed by: NEUROLOGICAL SURGERY

## 2018-02-15 PROCEDURE — 40000170 ZZH STATISTIC PRE-PROCEDURE ASSESSMENT II: Performed by: NEUROLOGICAL SURGERY

## 2018-02-15 PROCEDURE — 40000277 XR SURGERY CARM FLUORO LESS THAN 5 MIN W STILLS: Mod: TC

## 2018-02-15 PROCEDURE — 71000027 ZZH RECOVERY PHASE 2 EACH 15 MINS: Performed by: NEUROLOGICAL SURGERY

## 2018-02-15 PROCEDURE — 25000128 H RX IP 250 OP 636: Performed by: STUDENT IN AN ORGANIZED HEALTH CARE EDUCATION/TRAINING PROGRAM

## 2018-02-15 PROCEDURE — 36000061 ZZH SURGERY LEVEL 3 W FLUORO 1ST 30 MIN - UMMC: Performed by: NEUROLOGICAL SURGERY

## 2018-02-15 PROCEDURE — 37000009 ZZH ANESTHESIA TECHNICAL FEE, EACH ADDTL 15 MIN: Performed by: NEUROLOGICAL SURGERY

## 2018-02-15 PROCEDURE — 82962 GLUCOSE BLOOD TEST: CPT

## 2018-02-15 PROCEDURE — 37000008 ZZH ANESTHESIA TECHNICAL FEE, 1ST 30 MIN: Performed by: NEUROLOGICAL SURGERY

## 2018-02-15 RX ORDER — ONDANSETRON 2 MG/ML
4 INJECTION INTRAMUSCULAR; INTRAVENOUS EVERY 30 MIN PRN
Status: DISCONTINUED | OUTPATIENT
Start: 2018-02-15 | End: 2018-02-15 | Stop reason: HOSPADM

## 2018-02-15 RX ORDER — CEFAZOLIN SODIUM 1 G/3ML
1 INJECTION, POWDER, FOR SOLUTION INTRAMUSCULAR; INTRAVENOUS SEE ADMIN INSTRUCTIONS
Status: DISCONTINUED | OUTPATIENT
Start: 2018-02-15 | End: 2018-02-15 | Stop reason: HOSPADM

## 2018-02-15 RX ORDER — CEFAZOLIN SODIUM 2 G/100ML
2 INJECTION, SOLUTION INTRAVENOUS
Status: COMPLETED | OUTPATIENT
Start: 2018-02-15 | End: 2018-02-15

## 2018-02-15 RX ORDER — SODIUM CHLORIDE, SODIUM LACTATE, POTASSIUM CHLORIDE, CALCIUM CHLORIDE 600; 310; 30; 20 MG/100ML; MG/100ML; MG/100ML; MG/100ML
INJECTION, SOLUTION INTRAVENOUS CONTINUOUS PRN
Status: DISCONTINUED | OUTPATIENT
Start: 2018-02-15 | End: 2018-02-15

## 2018-02-15 RX ORDER — LIDOCAINE HYDROCHLORIDE 10 MG/ML
INJECTION, SOLUTION EPIDURAL; INFILTRATION; INTRACAUDAL; PERINEURAL PRN
Status: DISCONTINUED | OUTPATIENT
Start: 2018-02-15 | End: 2018-02-15 | Stop reason: HOSPADM

## 2018-02-15 RX ORDER — MEPERIDINE HYDROCHLORIDE 25 MG/ML
12.5 INJECTION INTRAMUSCULAR; INTRAVENOUS; SUBCUTANEOUS
Status: DISCONTINUED | OUTPATIENT
Start: 2018-02-15 | End: 2018-02-15 | Stop reason: HOSPADM

## 2018-02-15 RX ORDER — FENTANYL CITRATE 50 UG/ML
25-50 INJECTION, SOLUTION INTRAMUSCULAR; INTRAVENOUS EVERY 5 MIN PRN
Status: DISCONTINUED | OUTPATIENT
Start: 2018-02-15 | End: 2018-02-15 | Stop reason: HOSPADM

## 2018-02-15 RX ORDER — NALOXONE HYDROCHLORIDE 0.4 MG/ML
.1-.4 INJECTION, SOLUTION INTRAMUSCULAR; INTRAVENOUS; SUBCUTANEOUS
Status: DISCONTINUED | OUTPATIENT
Start: 2018-02-15 | End: 2018-02-15 | Stop reason: HOSPADM

## 2018-02-15 RX ORDER — FENTANYL CITRATE 50 UG/ML
25-50 INJECTION, SOLUTION INTRAMUSCULAR; INTRAVENOUS
Status: DISCONTINUED | OUTPATIENT
Start: 2018-02-15 | End: 2018-02-15 | Stop reason: HOSPADM

## 2018-02-15 RX ORDER — ONDANSETRON 4 MG/1
4 TABLET, ORALLY DISINTEGRATING ORAL EVERY 30 MIN PRN
Status: DISCONTINUED | OUTPATIENT
Start: 2018-02-15 | End: 2018-02-15 | Stop reason: HOSPADM

## 2018-02-15 RX ORDER — SODIUM CHLORIDE, SODIUM LACTATE, POTASSIUM CHLORIDE, CALCIUM CHLORIDE 600; 310; 30; 20 MG/100ML; MG/100ML; MG/100ML; MG/100ML
INJECTION, SOLUTION INTRAVENOUS CONTINUOUS
Status: DISCONTINUED | OUTPATIENT
Start: 2018-02-15 | End: 2018-02-15 | Stop reason: HOSPADM

## 2018-02-15 RX ADMIN — METHOHEXITAL SODIUM 10 MG: 500 INJECTION, POWDER, LYOPHILIZED, FOR SOLUTION INTRAMUSCULAR; INTRAVENOUS; RECTAL at 08:01

## 2018-02-15 RX ADMIN — METHOHEXITAL SODIUM 10 MG: 500 INJECTION, POWDER, LYOPHILIZED, FOR SOLUTION INTRAMUSCULAR; INTRAVENOUS; RECTAL at 08:26

## 2018-02-15 RX ADMIN — METHOHEXITAL SODIUM 40 MG: 500 INJECTION, POWDER, LYOPHILIZED, FOR SOLUTION INTRAMUSCULAR; INTRAVENOUS; RECTAL at 08:27

## 2018-02-15 RX ADMIN — METHOHEXITAL SODIUM 10 MG: 500 INJECTION, POWDER, LYOPHILIZED, FOR SOLUTION INTRAMUSCULAR; INTRAVENOUS; RECTAL at 08:16

## 2018-02-15 RX ADMIN — METHOHEXITAL SODIUM 20 MG: 500 INJECTION, POWDER, LYOPHILIZED, FOR SOLUTION INTRAMUSCULAR; INTRAVENOUS; RECTAL at 08:03

## 2018-02-15 RX ADMIN — METHOHEXITAL SODIUM 10 MG: 500 INJECTION, POWDER, LYOPHILIZED, FOR SOLUTION INTRAMUSCULAR; INTRAVENOUS; RECTAL at 08:06

## 2018-02-15 RX ADMIN — METHOHEXITAL SODIUM 30 MG: 500 INJECTION, POWDER, LYOPHILIZED, FOR SOLUTION INTRAMUSCULAR; INTRAVENOUS; RECTAL at 07:58

## 2018-02-15 RX ADMIN — CEFAZOLIN SODIUM 2 G: 2 INJECTION, SOLUTION INTRAVENOUS at 07:41

## 2018-02-15 RX ADMIN — METHOHEXITAL SODIUM 10 MG: 500 INJECTION, POWDER, LYOPHILIZED, FOR SOLUTION INTRAMUSCULAR; INTRAVENOUS; RECTAL at 08:14

## 2018-02-15 RX ADMIN — METHOHEXITAL SODIUM 40 MG: 500 INJECTION, POWDER, LYOPHILIZED, FOR SOLUTION INTRAMUSCULAR; INTRAVENOUS; RECTAL at 08:12

## 2018-02-15 RX ADMIN — SODIUM CHLORIDE, POTASSIUM CHLORIDE, SODIUM LACTATE AND CALCIUM CHLORIDE: 600; 310; 30; 20 INJECTION, SOLUTION INTRAVENOUS at 07:27

## 2018-02-15 NOTE — OR NURSING
Patient has both short term and long term memory loss.  She is oriented to self. When told the date she stated she would not remember that and she did not realize she was in a hospital in [place].. She did not initially realize what was being done today. Dr Mcclelland and Dr Wong from anesthesia notified of this also and both the patient and her  signed the consent.

## 2018-02-15 NOTE — OP NOTE
Procedure Date: 02/15/2018      PREOPERATIVE HISTORY:  Ms. Valle has multiple sclerosis and left-sided V2 and V3 trigeminal neuralgia.  After thorough discussion of the various options for management, she has elected to have a percutaneous radiofrequency trigeminal rhizotomy.      PREOPERATIVE DIAGNOSIS:  Trigeminal neuralgia.      POSTOPERATIVE DIAGNOSIS:  Trigeminal neuralgia.      PROCEDURE:   1. Stereotactic radiofrequency trigeminal rhizotomy, left  2. Intraoperative fluoroscopic guidance    SURGEON:  Gene Mcclelland MD      ASSISTANT:  Derek Hernandez MD      DESCRIPTION OF PROCEDURE:     The patient was brought into the operating room, anesthesia established an IV and gave preoperative sedation.  Two c-arm fluoroscopes were set up to provide AP and lateral fluoroscopy for the procedure. We prepared the skin over the left cheek.    A point approximately 2.5 cm lateral to the oral fissure was infiltrated with a small amount of lidocaine. Entry into the skin was made with a 15-gauge needle, and then, the Cosman radiofrequency needle with obturator in place was passed through the cheek, taking care not to enter the oral cavity.  With fluoroscopic guidance, the needle was passed to the foramen ovale and the position confirmed fluoroscopically by identifying foramen ovale on an AP film and the clival line on the lateral film.      With the straight electrode at the clival line, we got excellent V2 stimulation at 225 ohms and a threshold of 0.4 volts.  Anesthesia was deepened and we made a 75-degree lesion for 60 seconds.     While the anesthesia was still in effect, we backed the needle out and replaced the electrode with the curved electrode aimed inferolaterally.  When she was more alert, we stimulated and made an adjustment to the position and then got predominantly V3 stimulation at 290 ohms and a threshold of 0.4 volts.  Anesthesia was deepened for a second time and we made a 75-degree lesion for 60 seconds.      We terminated the procedure by removing the needle.  The patient was returned to the recovery room for observation.  There was no blood loss, and the patient tolerated the procedure well.       I, Dr. Gene Mcclelland, was present for the entire operation.         GENE MCCLELLAND MD             D: 02/15/2018   T: 02/15/2018   MT: JUVE      Name:     JOHANN BUSH   MRN:      [id number]        Account:        [id number]   :      1960           Procedure Date: 02/15/2018      Document: [id number]

## 2018-02-15 NOTE — IP AVS SNAPSHOT
MRN:[id number]                      After Visit Summary   2/15/2018    Slime Valle    MRN: [id number]           Thank you!     Thank you for choosing [facility] for your care. Our goal is always to provide you with excellent care. Hearing back from our patients is one way we can continue to improve our services. Please take a few minutes to complete the written survey that you may receive in the mail after you visit with us. Thank you!        Patient Information     Date Of Birth          1960        About your hospital stay     You were admitted on:  February 15, 2018 You last received care in the:  [facility]    You were discharged on:  February 15, 2018       Who to Call     For medical emergencies, please call 911.  For non-urgent questions about your medical care, please call your primary care provider or clinic, [phone]  For questions related to your surgery, please call your surgery clinic        Attending Provider     Provider Gene Rivera MD Neurosurgery       Primary Care Provider Office Phone # Fax #    Gertrudis LISA Perez [phone]      Further instructions from your care team         You underwent a Percutaneous rhizotomy.  Our physician's assistant, Bhavani Oreilly, will be calling you within 1-3 days to discuss your trigeminal medications.   - If you have not heard from our clinic about your follow up visit by 3-4 days following your discharge, please call our clinic at [phone] to schedule an appointment with the Neurosurgery teams.     This surgery was done by Gene Mcclelland MD      After discharge, your activity restrictions are:   -We encourage short frequent walks, increasing as tolerated.  - No strenuous activity.  - No lifting more than 10 pounds until you are seen in clinic (a gallon of milk weighs approximately 8 pounds)    Wound cares after surgery  - You are ok to shower, but do not soak  "your incisions. Pat them dry if they get damp.   - No baths, hot tubs or pools for 4-6 weeks after surgery.       Call if you have any of the followin. Temperature greater than 101.5 F.   2. Any redness, swelling or discharge from the wound.   3. Any new weakness, numbness or altered mental status.  4. Worsening pain that is not improving with the pain medications you were prescribed.     Call [phone] or after 5:00 pm or on weekends call [phone] and ask for the neurosurgery resident on call. Thank You.              Pending Results     No orders found from 2018 to 2018.            Admission Information     Date & Time Provider Department Dept. Phone    2/15/2018 Gene Mcclelland MD Same Day Surgery [facility] [phone]      Your Vitals Were     Blood Pressure Temperature Respirations Height Weight Pulse Oximetry    124/81 98.1  F (36.7  C) (Oral) 16 1.727 m (5' 8\") 52.7 kg (116 lb 2.9 oz) 100%    BMI (Body Mass Index)                   17.67 kg/m2           MyChart Information     [employer] lets you send messages to your doctor, view your test results, renew your prescriptions, schedule appointments and more. To sign up, go to [email or web address] . Click on \"Log in\" on the left side of the screen, which will take you to the Welcome page. Then click on \"Sign up Now\" on the right side of the page.     You will be asked to enter the access code listed below, as well as some personal information. Please follow the directions to create your username and password.     Your access code is: [email or web address]  Expires: 2018 12:29 PM     Your access code will  in 90 days. If you need help or a new code, please call your [facility] clinic or [phone].        Care EveryWhere ID     This is your Care EveryWhere ID. This could be used by other organizations to access your [facility] medical records  [id number]        Equal Access to Services     AMANDA GUERRERO AH: Aixa marin " Soomaali, waaxda luqadaha, qaybta kaalmada adegil, melva isidory laureano gigipastor lasukhdeepjasmeet erwin. So [facility] [phone].    ATENCIÓN: Si dayron rubin, tiene a hammonds disposición servicios gratuitos de asistencia lingüística. Judi al [phone].    We comply with applicable federal civil rights laws and [place] laws. We do not discriminate on the basis of race, color, national origin, age, disability, sex, sexual orientation, or gender identity.               Review of your medicines      CONTINUE these medicines which have NOT CHANGED        Dose / Directions    * amitriptyline 10 MG tablet   Commonly known as:  ELAVIL        Dose:  10 mg   Take 10 mg by mouth At Bedtime   Refills:  0       * amitriptyline 50 MG tablet   Commonly known as:  ELAVIL        Dose:  50 mg   Take 50 mg by mouth At Bedtime   Refills:  0       baclofen 10 MG tablet   Commonly known as:  LIORESAL   Indication:  EXCEPT IN THE MORNING WHEN SHE TAKES 2 TABS        Dose:  10 mg   Take 10 mg by mouth 3 times daily 20 mg in the morning, 10 mg at supper, and 10 mg at bedtime   Refills:  0       cholecalciferol 1000 UNIT tablet   Commonly known as:  vitamin D3        Dose:  1000 Units   Take 1,000 Units by mouth 2 times daily   Refills:  0       gabapentin 100 MG capsule   Commonly known as:  NEURONTIN        Dose:  100 mg   Take 100 mg by mouth 2 times daily   Refills:  0       Interferon Beta-1a 44 MCG/0.5ML Soaj        Dose:  44 mcg   Inject 44 mcg into the muscle three times a week   Refills:  0       KEPPRA 500 MG tablet   Generic drug:  levETIRAcetam        Dose:  500 mg   Take 500 mg by mouth 2 times daily   Refills:  0       memantine 10 MG tablet   Commonly known as:  NAMENDA        Dose:  10 mg   Take 10 mg by mouth 2 times daily   Refills:  0       PHENYTOIN PO        Dose:  300 mg   Take 300 mg by mouth At Bedtime   Refills:  0       venlafaxine 225 MG Tb24 24 hr tablet   Commonly known as:  EFFEXOR-ER        Dose:  225 mg   Take 225 mg  by mouth daily (with breakfast)   Refills:  0       * Notice:  This list has 2 medication(s) that are the same as other medications prescribed for you. Read the directions carefully, and ask your doctor or other care provider to review them with you.             Protect others around you: Learn how to safely use, store and throw away your medicines at www.disposemymeds.org.             Medication List: This is a list of all your medications and when to take them. Check marks below indicate your daily home schedule. Keep this list as a reference.      Medications           Morning Afternoon Evening Bedtime As Needed    * amitriptyline 10 MG tablet   Commonly known as:  ELAVIL   Take 10 mg by mouth At Bedtime                                * amitriptyline 50 MG tablet   Commonly known as:  ELAVIL   Take 50 mg by mouth At Bedtime                                baclofen 10 MG tablet   Commonly known as:  LIORESAL   Take 10 mg by mouth 3 times daily 20 mg in the morning, 10 mg at supper, and 10 mg at bedtime                                cholecalciferol 1000 UNIT tablet   Commonly known as:  vitamin D3   Take 1,000 Units by mouth 2 times daily                                gabapentin 100 MG capsule   Commonly known as:  NEURONTIN   Take 100 mg by mouth 2 times daily                                Interferon Beta-1a 44 MCG/0.5ML Soaj   Inject 44 mcg into the muscle three times a week                                KEPPRA 500 MG tablet   Take 500 mg by mouth 2 times daily   Generic drug:  levETIRAcetam                                memantine 10 MG tablet   Commonly known as:  NAMENDA   Take 10 mg by mouth 2 times daily                                PHENYTOIN PO   Take 300 mg by mouth At Bedtime                                venlafaxine 225 MG Tb24 24 hr tablet   Commonly known as:  EFFEXOR-ER   Take 225 mg by mouth daily (with breakfast)                                * Notice:  This list has 2 medication(s) that are  the same as other medications prescribed for you. Read the directions carefully, and ask your doctor or other care provider to review them with you.

## 2018-02-15 NOTE — ANESTHESIA POSTPROCEDURE EVALUATION
Patient: Slime Valle    Procedure(s):  Radio Frequency Percutaneous Left Trigeminal Rhizotomy - Wound Class: I-Clean    Diagnosis:Secondary Trigeminal Neuralgia   Diagnosis Additional Information: No value filed.    Anesthesia Type:  MAC    Note:  Anesthesia Post Evaluation    Patient location during evaluation: Phase 2  Patient participation: Able to fully participate in evaluation  Level of consciousness: awake and alert  Pain management: adequate  Airway patency: patent  Cardiovascular status: acceptable  Respiratory status: acceptable  Hydration status: acceptable  PONV: none     Anesthetic complications: None    Comments: Looks great.  Ready to discharge.         Last vitals:  Vitals:    02/15/18 0557 02/15/18 0840 02/15/18 0845   BP: 91/68 124/75 124/81   Resp: 18 18 16   Temp: 36.7  C (98.1  F) 36.7  C (98.1  F)    SpO2: 98% 100% 100%         Electronically Signed By: William Wong MD  February 15, 2018  9:45 AM

## 2018-02-15 NOTE — BRIEF OP NOTE
[facility], [place]    Brief Operative Note    Pre-operative diagnosis: Secondary Trigeminal Neuralgia   Post-operative diagnosis Same  Procedure: Procedure(s):  Radio Frequency Percutaneous Left Trigeminal Rhizotomy - Wound Class: I-Clean  Surgeon: Surgeon(s) and Role:     * Gene Mcclelland MD - Primary     * Dion Hernandez MD - Resident - Assisting  Anesthesia: Monitor Anesthesia Care   Estimated blood loss: Minimal  Drains: None  Specimens: * No specimens in log *  Findings:   Appropriate placement of stimulating electrode based on intra-operative assessment. Lesions made to V2 and V3.  Complications: None.  Implants: None.

## 2018-02-15 NOTE — PROGRESS NOTES
Service Date: 2018      HISTORY OF PRESENT ILLNESS:  Ms. Bush is seen today in preparation for a left radiofrequency rhizotomy to treat trigeminal neuralgia secondary to multiple sclerosis.  A detailed summary of today's visit has been entered into the record by my nurse practitioner, Bhavani Oreilly, which I have reviewed, amended and incorporated into my own.      ASSESSMENT AND PLAN:  I discussed the procedure with Ms. Bush and her  and explained the various steps that would be taken to accomplish a safe rhizotomy.  We talked about risks including meningitis, hemorrhage and even stroke and death.  I indicated that these complications are very uncommon.  They asked a number of excellent questions, which I answered and seemed to have a clear understanding of the procedure and its potential risks.  We are scheduled to perform the procedure tomorrow.         KJ OTT MD             D: 2018   T: 02/15/2018   MT: SOLITARIO      Name:     JOHANN BUSH   MRN:      [id number]        Account:      [id number]   :      1960           Service Date: 2018      Document: [id number]

## 2018-02-15 NOTE — ANESTHESIA CARE TRANSFER NOTE
Patient: Slime Valle    Procedure(s):  Radio Frequency Percutaneous Left Trigeminal Rhizotomy - Wound Class: I-Clean    Diagnosis: Secondary Trigeminal Neuralgia   Diagnosis Additional Information: No value filed.    Anesthesia Type:   MAC     Note:  Airway :Nasal Cannula  Patient transferred to:Phase II  Handoff Report: Identifed the Patient, Identified the Reponsible Provider, Reviewed the pertinent medical history, Discussed the surgical course, Reviewed Intra-OP anesthesia mangement and issues during anesthesia, Set expectations for post-procedure period and Allowed opportunity for questions and acknowledgement of understanding      Vitals: (Last set prior to Anesthesia Care Transfer)    CRNA VITALS  2/15/2018 0800 - 2/15/2018 0856      2/15/2018             Pulse: 79                Electronically Signed By: Junior Fields MD  February 15, 2018  8:56 AM

## 2018-02-15 NOTE — IP AVS SNAPSHOT
Same Day Surgery [address] [address]    Phone:  [phone]                                       After Visit Summary   2/15/2018    Slime Valle    MRN: [id number]           After Visit Summary Signature Page     I have received my discharge instructions, and my questions have been answered. I have discussed any challenges I see with this plan with the nurse or doctor.    ..........................................................................................................................................  Patient/Patient Representative Signature      ..........................................................................................................................................  Patient Representative Print Name and Relationship to Patient    ..................................................               ................................................  Date                                            Time    ..........................................................................................................................................  Reviewed by Signature/Title    ...................................................              ..............................................  Date                                                            Time

## 2018-02-15 NOTE — DISCHARGE INSTRUCTIONS
You underwent a Percutaneous rhizotomy.  Our physician's assistant, Bhavani Oreilly, will be calling you within 1-3 days to discuss your trigeminal medications.   - If you have not heard from our clinic about your follow up visit by 3-4 days following your discharge, please call our clinic at [phone] to schedule an appointment with the Neurosurgery teams.     This surgery was done by Gene Mcclelland MD      After discharge, your activity restrictions are:   -We encourage short frequent walks, increasing as tolerated.  - No strenuous activity.  - No lifting more than 10 pounds until you are seen in clinic (a gallon of milk weighs approximately 8 pounds)    Wound cares after surgery  - You are ok to shower, but do not soak your incisions. Pat them dry if they get damp.   - No baths, hot tubs or pools for 4-6 weeks after surgery.       Call if you have any of the followin. Temperature greater than 101.5 F.   2. Any redness, swelling or discharge from the wound.   3. Any new weakness, numbness or altered mental status.  4. Worsening pain that is not improving with the pain medications you were prescribed.     Call [phone] or after 5:00 pm or on weekends call [phone] and ask for the neurosurgery resident on call. Thank You.

## 2018-02-17 PROBLEM — G35 MULTIPLE SCLEROSIS (H): Status: ACTIVE | Noted: 2018-02-17

## 2018-02-17 PROBLEM — G50.0 SECONDARY TRIGEMINAL NEURALGIA: Status: ACTIVE | Noted: 2018-02-17

## 2018-02-22 ENCOUNTER — CARE COORDINATION (OUTPATIENT)
Dept: NEUROSURGERY | Facility: CLINIC | Age: 58
End: 2018-02-22

## 2018-02-22 NOTE — PROGRESS NOTES
Neurosurgery Discharge Coordination Note     Attending physician: Dr. Mcclelland  Surgery performed: 2/15/18  Date of Discharge: 2/15/18  Discharge to: Home     Current status:   Patient states no facial pain, continues to have some numbness Left cheek area.  Patient is eating well, doing ADL's and sleeping without difficulty.  Denies redness, swelling, increased tenderness, drainage, incision opening or elevated temp. Reports Incision CDI without signs of infection.  Denies current bowel or bladder issues.    Discharge instructions and medications reviewed with patient.  Patient on same medications post procedure.    Follow up appointments requested closer to home, lives several hours [facility].      RN triage/on call number given: [phone]/ [phone]      2/23/18  Spoke with  again, Patient is pain Free some numbness around jaw and cheek area.  Consulted with ZANE Oreilly, Pt can see Neurologist at home, Dr. Heydi Estrada for 2 week post procedure follow up and   Work with PCP or Neurologist to taper off one RX at a time and slowly.  Please keep Dr. Mcclelland posted on how she is doing and medication tapering.     voices understanding and will call for any issues or concerns, has my telephone Number.

## 2019-03-27 ENCOUNTER — PATIENT OUTREACH (OUTPATIENT)
Dept: NEUROSURGERY | Facility: CLINIC | Age: 59
End: 2019-03-27

## 2019-03-27 NOTE — PROGRESS NOTES
" calling stating patient is now having electrical type shocks on the right side of face.  Pt was treated for Left sided TN on 2/15/19 with Radiofrequency Percutaneious Left Trigeminal Rhizotomy.   states left side of face is great, no numbness no pain but it has now moved to right side. Right sided (sounds like V2 ) mid jaw area pains more intense and frequent this past week.  Pt is having a \"nerve block\" on right side today.   states patient is interested in procedure on Right side.    Will refer to Dr Mcclelland and get back to patient .      "

## 2019-03-27 NOTE — PROGRESS NOTES
Spoke with , offered appointment with ZANE Oreilly to clarify TN issues and appropriate procedure if needed.     states patient having nerve block on right side tomorrow and will callback with update. Explained would be happy to help get pt in for appointment and procedure can be scheduled.    Voices understanding.

## 2019-10-28 ENCOUNTER — PATIENT OUTREACH (OUTPATIENT)
Dept: NEUROSURGERY | Facility: CLINIC | Age: 59
End: 2019-10-28

## 2019-10-28 NOTE — PROGRESS NOTES
calling regarding facial pain on Right side, no pain on the left.  Facial nerve block 3/28/19 on the right side and it did help pain.    Right sided pain returned in August and went back to pain MD 9/3/19, no nerve block given, remains in intermittent right sided facial pain on medications.  Pt is seen   For treatment trigeminal neuralgia secondary to multiple sclerosis.    DOS:  2/15/18 Radio Frequency Percutaneous Left Trigeminal Rhizotomy     wondering if it is time for another procedure.      Will research with Dr Mcclelland and get back to patient.

## 2019-10-31 NOTE — PROGRESS NOTES
Per Dr Mcclelland, patient will need to seek services with Dr Pagan now.    Callback to patient, spoke with , wanting to see if another Rhizotomy is possible.    Explained will need appointment with Dr Pagan to visit and discuss procedure and what is needed.  Appointment set for 12/3 @ 1050.    Voices  Understanding, patient has no new scans, previous information from Dr Mcclelland is set.    Note to  to call  to update insurance information.

## 2019-11-04 ENCOUNTER — DOCUMENTATION ONLY (OUTPATIENT)
Dept: CARE COORDINATION | Facility: CLINIC | Age: 59
End: 2019-11-04

## 2019-12-03 ENCOUNTER — OFFICE VISIT (OUTPATIENT)
Dept: NEUROSURGERY | Facility: CLINIC | Age: 59
End: 2019-12-03
Payer: MEDICARE

## 2019-12-03 VITALS
HEART RATE: 80 BPM | WEIGHT: 137.8 LBS | DIASTOLIC BLOOD PRESSURE: 68 MMHG | OXYGEN SATURATION: 95 % | SYSTOLIC BLOOD PRESSURE: 108 MMHG | BODY MASS INDEX: 20.95 KG/M2 | RESPIRATION RATE: 16 BRPM

## 2019-12-03 DIAGNOSIS — G50.0 SECONDARY TRIGEMINAL NEURALGIA: Primary | ICD-10-CM

## 2019-12-03 ASSESSMENT — PAIN SCALES - GENERAL: PAINLEVEL: NO PAIN (0)

## 2019-12-03 NOTE — PROGRESS NOTES
Service Date: 2019      December 3, 2019        Gertrudis Perez [facility]    [address] [place] [address]      RE: Slime Valle   MRN: [id number]   : 1960              Dear Dr. Perez:        I had the pleasure to see Slime today in my Neurosurgical Clinic for evaluation of facial pain.      Briefly, Slime is a 59-year-old woman who [place].  She has a longstanding history of multiple sclerosis; however, it sounds like it has been pretty stable for her.  She also has a history of trigeminal neuralgia.  Originally, the pain was primarily on the left side of her face and this was treated initially with several peripheral nerve injections but then ultimately followed by a radiofrequency ablation by my partner, Dr. Gene Mcclelland.  She had recurrence of the pain and that this required an additional radiofrequency ablation.  At this point in time, she is pain-free on the left side of her face.  Over the last year, she has developed pain in the right side of her face that would be consistent with trigeminal neuralgia also.  She underwent a peripheral nerve injection that kept it at bay for roughly a year and then the pain recurred and she recently in early November had another peripheral nerve injection and so at this point in time is without pain.  She comes in today concerned about what will be the next step when the pain recurs.  We talked about treatment options and ultimately felt that a radiofrequency rhizotomy would be best for her.  Given the fact she is doing so well right now we will hold off on doing it at this point but as soon as she feels any recurrence at all, then we will move forward with a right-sided radiofrequency rhizotomy targeting what I believe is V2 pain.  Her  will pay attention to the next couple attacks to really decide if this is V2 or V3 pain.  I have given her my cell number and email  address and told her to give me a call at the first sign of any recurrence.      Overall, I spent approximately 25 minutes with Slime with the majority of this time spent in consultation and developing a treatment plan.      Thank you for your trust and the opportunity to participate in Slime's care.  If you have any further questions or concerns, please feel free to contact me on my cell phone at [phone].        With Kindest Personal Regards,       MD TARAH Jade MD             D: 2019   T: 2019   MT: tb      Name:     SLIME BUSH   MRN:      [id number]        Account:      [id number]   :      1960           Service Date: 2019      Document: [id number]

## 2019-12-03 NOTE — LETTER
12/3/2019       RE: Slime Valle  [address] [address]     Dear Colleague,    Thank you for referring your patient, Slime Valle, to the [facility] NEUROSURGERY at [facility]. Please see a copy of my visit note below.    Service Date: 2019      December 3, 2019        Gertrudis Perez [facility]    [address] [place] [address]      RE: Slime Valle   MRN: [id number]   : 1960              Dear Dr. Perez:        I had the pleasure to see Slime today in my Neurosurgical Clinic for evaluation of facial pain.      Briefly, Slime is a 59-year-old woman who [place].  She has a longstanding history of multiple sclerosis; however, it sounds like it has been pretty stable for her.  She also has a history of trigeminal neuralgia.  Originally, the pain was primarily on the left side of her face and this was treated initially with several peripheral nerve injections but then ultimately followed by a radiofrequency ablation by my partner, Dr. Gene Mcclelland.  She had recurrence of the pain and that this required an additional radiofrequency ablation.  At this point in time, she is pain-free on the left side of her face.  Over the last year, she has developed pain in the right side of her face that would be consistent with trigeminal neuralgia also.  She underwent a peripheral nerve injection that kept it at bay for roughly a year and then the pain recurred and she recently in early November had another peripheral nerve injection and so at this point in time is without pain.  She comes in today concerned about what will be the next step when the pain recurs.  We talked about treatment options and ultimately felt that a radiofrequency rhizotomy would be best for her.  Given the fact she is doing so well right now we will hold off on doing it at this point but as soon as  she feels any recurrence at all, then we will move forward with a right-sided radiofrequency rhizotomy targeting what I believe is V2 pain.  Her  will pay attention to the next couple attacks to really decide if this is V2 or V3 pain.  I have given her my cell number and email address and told her to give me a call at the first sign of any recurrence.      Overall, I spent approximately 25 minutes with Slime with the majority of this time spent in consultation and developing a treatment plan.      Thank you for your trust and the opportunity to participate in Slime's care.  If you have any further questions or concerns, please feel free to contact me on my cell phone at [phone].        With Kindest Personal Regards,       Cipriano Pagan MD           D: 2019   T: 2019   MT: jeremie      Name:     SLIME BUSH   MRN:      [id number]        Account:      [id number]   :      1960           Service Date: 2019      Document: [id number]

## 2019-12-03 NOTE — PATIENT INSTRUCTIONS
Follow up with Dr Pagan as needed for Trigeminal Neuralgia  For possible Radiofrequency Rhizotomy on right side.      Call Cornelia RN for questions/concerns

## 2020-01-10 ENCOUNTER — TELEPHONE (OUTPATIENT)
Dept: NEUROSURGERY | Facility: CLINIC | Age: 60
End: 2020-01-10

## 2020-01-10 DIAGNOSIS — G50.0 SECONDARY TRIGEMINAL NEURALGIA: Primary | ICD-10-CM

## 2020-01-10 NOTE — TELEPHONE ENCOUNTER
[facility] Call Center    Phone Message    May a detailed message be left on voicemail: yes    Reason for Call: Symptoms or Concerns     If patient has red-flag symptoms, warm transfer to triage line    Current symptom or concern: pain right jaw    Symptoms have been present for:  2 week(s)    Has patient previously been seen for this? Yes    By : Dr. Pagan    Date: 12/3/2019    Are there any new or worsening symptoms? Yes: patients  calling stating patient is starting to have pain in her jaw again and was told to let him know the minute the pain started. Please call to discuss thank you.       Action Taken: Message routed to:  [facility] Neurosurgery

## 2020-01-13 NOTE — TELEPHONE ENCOUNTER
Callback to Marvin Palencia.  Pt is having facial pain that started 12/28/19 lasting about one hour, next pain on 1/7/20 and again on 1/8/20 and no pain since that time. Right sided facial pain, wanting to schedule Right Radiofrequency Rhizotomy.  Pt will do pre op at PCP due to distance.  PCP: fax number . [facility].  Phone   Marvin asking for surgery within 2 weeks.  Explained need to get Insurance approval and Orders in.  Will callback with update.    Voices understanding.

## 2020-01-24 NOTE — TELEPHONE ENCOUNTER
Spoke with Marvin, explained patient is on the list for surgery scheduling and will keep watch when surgery is scheduled.  Apologized for the delay.

## 2020-01-27 DIAGNOSIS — G50.0 SECONDARY TRIGEMINAL NEURALGIA: Primary | ICD-10-CM

## 2020-01-29 ENCOUNTER — TELEPHONE (OUTPATIENT)
Dept: NEUROSURGERY | Facility: CLINIC | Age: 60
End: 2020-01-29

## 2020-01-29 NOTE — TELEPHONE ENCOUNTER
Patient is scheduled for surgery with Dr. Pagan       Spoke or left message with:      Date of Surgery: 03/09/20    Location [facility] OR     H&P: Scheduled with PCP     Post op: 03/23/20    Additional imaging/appointments: NA    Surgery packet sent: The nurse will be sending this out     Additional comments:  The patient is aware they need a pre-op at least a couple of weeks before surgery date. We went over the patient needing a  and someone to stay with them for 24 hours after the surgery. The patient was given my direct number for any questions [phone]

## 2020-02-05 ENCOUNTER — TELEPHONE (OUTPATIENT)
Dept: NEUROSURGERY | Facility: CLINIC | Age: 60
End: 2020-02-05

## 2020-02-05 NOTE — TELEPHONE ENCOUNTER
3/9 Ej Right Radiofrequency Rhizotomy 0800 Arrival 0600  2/5 [facility] packet mailed  2/5 H&P by PCP, [facility] form in surgery packet

## 2020-02-10 ENCOUNTER — HEALTH MAINTENANCE LETTER (OUTPATIENT)
Age: 60
End: 2020-02-10

## 2020-02-10 ENCOUNTER — TELEPHONE (OUTPATIENT)
Dept: NEUROSURGERY | Facility: CLINIC | Age: 60
End: 2020-02-10

## 2020-02-10 NOTE — TELEPHONE ENCOUNTER
[facility] Call Center    Phone Message    May a detailed message be left on voicemail: yes     Reason for Call: Other: Adia A from [facility] states they are doing a pre-op for the pt on 2.24.20, and they need the last dictation-office notes from Dr Pagan. Please fax notes to [phone]. Please call [phone] and ask for Adia A with any questions. Thanks.     Action Taken: Message routed to:  [facility]  neurosurgery    Travel Screening: Not Applicable

## 2020-02-17 ENCOUNTER — TELEPHONE (OUTPATIENT)
Dept: NEUROSURGERY | Facility: CLINIC | Age: 60
End: 2020-02-17

## 2020-02-17 NOTE — TELEPHONE ENCOUNTER
calling stating PCP Dr Gertrudis Perez office did not want to complete pre op lab work.    Call to Dr Perez office , asking for lab work to be completed at pre op H&P appointment.  Left message for Dr Perez triage nurse to return call.

## 2020-02-19 ENCOUNTER — TELEPHONE (OUTPATIENT)
Dept: NEUROSURGERY | Facility: CLINIC | Age: 60
End: 2020-02-19

## 2020-02-19 NOTE — TELEPHONE ENCOUNTER
Spoke with Marvin, states patient now in daily pain for past 4 days, taking Tramadol from PCP for facial pain.   Marvin asking for moved up time for Radiofrequency Rhizotomy.  Also asking if ok to have pain clinic near home give pain block.  Pt having hard time eating.    Explained if nerve block completed the Radiofrequency Rhizotomy may not work as well.  Will check on moving date up and get back to Marvin.    Voices understanding.

## 2020-03-06 RX ORDER — TRAMADOL HYDROCHLORIDE 50 MG/1
50 TABLET ORAL EVERY 8 HOURS PRN
COMMUNITY

## 2020-03-08 ENCOUNTER — ANESTHESIA EVENT (OUTPATIENT)
Dept: SURGERY | Facility: CLINIC | Age: 60
End: 2020-03-08
Payer: MEDICARE

## 2020-03-09 ENCOUNTER — HOSPITAL ENCOUNTER (OUTPATIENT)
Facility: CLINIC | Age: 60
Discharge: HOME | End: 2020-03-09
Attending: ANESTHESIOLOGY | Admitting: ANESTHESIOLOGY
Payer: MEDICARE

## 2020-03-09 ENCOUNTER — APPOINTMENT (OUTPATIENT)
Dept: INTERVENTIONAL RADIOLOGY/VASCULAR | Facility: CLINIC | Age: 60
End: 2020-03-09
Attending: NEUROLOGICAL SURGERY
Payer: MEDICARE

## 2020-03-09 ENCOUNTER — ANESTHESIA (OUTPATIENT)
Dept: SURGERY | Facility: CLINIC | Age: 60
End: 2020-03-09
Payer: MEDICARE

## 2020-03-09 VITALS
OXYGEN SATURATION: 96 % | RESPIRATION RATE: 16 BRPM | BODY MASS INDEX: 20.45 KG/M2 | HEIGHT: 67 IN | HEART RATE: 78 BPM | WEIGHT: 130.29 LBS | SYSTOLIC BLOOD PRESSURE: 98 MMHG | TEMPERATURE: 97.8 F | DIASTOLIC BLOOD PRESSURE: 67 MMHG

## 2020-03-09 DIAGNOSIS — G50.0 SECONDARY TRIGEMINAL NEURALGIA: ICD-10-CM

## 2020-03-09 LAB
ABO + RH BLD: NORMAL
ABO + RH BLD: NORMAL
ANION GAP SERPL CALCULATED.3IONS-SCNC: 7 MMOL/L (ref 3–14)
APTT PPP: 26 SEC (ref 22–37)
BLD GP AB SCN SERPL QL: NORMAL
BLOOD BANK CMNT PATIENT-IMP: NORMAL
BUN SERPL-MCNC: 20 MG/DL (ref 7–30)
CALCIUM SERPL-MCNC: 9.5 MG/DL (ref 8.5–10.1)
CHLORIDE SERPL-SCNC: 107 MMOL/L (ref 94–109)
CO2 SERPL-SCNC: 26 MMOL/L (ref 20–32)
CREAT SERPL-MCNC: 0.68 MG/DL (ref 0.52–1.04)
ERYTHROCYTE [DISTWIDTH] IN BLOOD BY AUTOMATED COUNT: 12.2 % (ref 10–15)
GFR SERPL CREATININE-BSD FRML MDRD: >90 ML/MIN/{1.73_M2}
GLUCOSE BLDC GLUCOMTR-MCNC: 82 MG/DL (ref 70–99)
GLUCOSE SERPL-MCNC: 87 MG/DL (ref 70–99)
HCT VFR BLD AUTO: 45.6 % (ref 35–47)
HGB BLD-MCNC: 14.6 G/DL (ref 11.7–15.7)
INR PPP: 1.05 (ref 0.86–1.14)
INTERPRETATION ECG - MUSE: NORMAL
MCH RBC QN AUTO: 30 PG (ref 26.5–33)
MCHC RBC AUTO-ENTMCNC: 32 G/DL (ref 31.5–36.5)
MCV RBC AUTO: 94 FL (ref 78–100)
PLATELET # BLD AUTO: 245 10E9/L (ref 150–450)
POTASSIUM SERPL-SCNC: 3.9 MMOL/L (ref 3.4–5.3)
RBC # BLD AUTO: 4.86 10E12/L (ref 3.8–5.2)
SODIUM SERPL-SCNC: 140 MMOL/L (ref 133–144)
SPECIMEN EXP DATE BLD: NORMAL
WBC # BLD AUTO: 7.8 10E9/L (ref 4–11)

## 2020-03-09 PROCEDURE — 86901 BLOOD TYPING SEROLOGIC RH(D): CPT | Performed by: STUDENT IN AN ORGANIZED HEALTH CARE EDUCATION/TRAINING PROGRAM

## 2020-03-09 PROCEDURE — 85610 PROTHROMBIN TIME: CPT | Performed by: STUDENT IN AN ORGANIZED HEALTH CARE EDUCATION/TRAINING PROGRAM

## 2020-03-09 PROCEDURE — 36415 COLL VENOUS BLD VENIPUNCTURE: CPT | Performed by: STUDENT IN AN ORGANIZED HEALTH CARE EDUCATION/TRAINING PROGRAM

## 2020-03-09 PROCEDURE — 86850 RBC ANTIBODY SCREEN: CPT | Performed by: STUDENT IN AN ORGANIZED HEALTH CARE EDUCATION/TRAINING PROGRAM

## 2020-03-09 PROCEDURE — 86900 BLOOD TYPING SEROLOGIC ABO: CPT | Performed by: STUDENT IN AN ORGANIZED HEALTH CARE EDUCATION/TRAINING PROGRAM

## 2020-03-09 PROCEDURE — 25000128 H RX IP 250 OP 636: Performed by: STUDENT IN AN ORGANIZED HEALTH CARE EDUCATION/TRAINING PROGRAM

## 2020-03-09 PROCEDURE — 93010 ELECTROCARDIOGRAM REPORT: CPT | Mod: 59 | Performed by: INTERNAL MEDICINE

## 2020-03-09 PROCEDURE — 71000027 ZZH RECOVERY PHASE 2 EACH 15 MINS

## 2020-03-09 PROCEDURE — 25800030 ZZH RX IP 258 OP 636: Performed by: NURSE ANESTHETIST, CERTIFIED REGISTERED

## 2020-03-09 PROCEDURE — 93005 ELECTROCARDIOGRAM TRACING: CPT

## 2020-03-09 PROCEDURE — 37000008 ZZH ANESTHESIA TECHNICAL FEE, 1ST 30 MIN

## 2020-03-09 PROCEDURE — 80048 BASIC METABOLIC PNL TOTAL CA: CPT | Performed by: STUDENT IN AN ORGANIZED HEALTH CARE EDUCATION/TRAINING PROGRAM

## 2020-03-09 PROCEDURE — 40000065 ZZH STATISTIC EKG NON-CHARGEABLE

## 2020-03-09 PROCEDURE — 40000170 ZZH STATISTIC PRE-PROCEDURE ASSESSMENT II

## 2020-03-09 PROCEDURE — 85730 THROMBOPLASTIN TIME PARTIAL: CPT | Performed by: STUDENT IN AN ORGANIZED HEALTH CARE EDUCATION/TRAINING PROGRAM

## 2020-03-09 PROCEDURE — 77002 NEEDLE LOCALIZATION BY XRAY: CPT | Mod: TC

## 2020-03-09 PROCEDURE — 85027 COMPLETE CBC AUTOMATED: CPT | Performed by: STUDENT IN AN ORGANIZED HEALTH CARE EDUCATION/TRAINING PROGRAM

## 2020-03-09 PROCEDURE — 25000125 ZZHC RX 250: Performed by: NURSE ANESTHETIST, CERTIFIED REGISTERED

## 2020-03-09 PROCEDURE — 82962 GLUCOSE BLOOD TEST: CPT

## 2020-03-09 PROCEDURE — 37000009 ZZH ANESTHESIA TECHNICAL FEE, EACH ADDTL 15 MIN

## 2020-03-09 RX ORDER — SODIUM CHLORIDE, SODIUM LACTATE, POTASSIUM CHLORIDE, CALCIUM CHLORIDE 600; 310; 30; 20 MG/100ML; MG/100ML; MG/100ML; MG/100ML
INJECTION, SOLUTION INTRAVENOUS CONTINUOUS
Status: DISCONTINUED | OUTPATIENT
Start: 2020-03-09 | End: 2020-03-09 | Stop reason: HOSPADM

## 2020-03-09 RX ORDER — SODIUM CHLORIDE, SODIUM LACTATE, POTASSIUM CHLORIDE, CALCIUM CHLORIDE 600; 310; 30; 20 MG/100ML; MG/100ML; MG/100ML; MG/100ML
INJECTION, SOLUTION INTRAVENOUS CONTINUOUS PRN
Status: DISCONTINUED | OUTPATIENT
Start: 2020-03-09 | End: 2020-03-09

## 2020-03-09 RX ORDER — CEFAZOLIN SODIUM 2 G/100ML
2 INJECTION, SOLUTION INTRAVENOUS
Status: COMPLETED | OUTPATIENT
Start: 2020-03-09 | End: 2020-03-09

## 2020-03-09 RX ORDER — CEFAZOLIN SODIUM 1 G/3ML
1 INJECTION, POWDER, FOR SOLUTION INTRAMUSCULAR; INTRAVENOUS SEE ADMIN INSTRUCTIONS
Status: DISCONTINUED | OUTPATIENT
Start: 2020-03-09 | End: 2020-03-09 | Stop reason: HOSPADM

## 2020-03-09 RX ORDER — FENTANYL CITRATE 50 UG/ML
25-50 INJECTION, SOLUTION INTRAMUSCULAR; INTRAVENOUS
Status: DISCONTINUED | OUTPATIENT
Start: 2020-03-09 | End: 2020-03-09 | Stop reason: HOSPADM

## 2020-03-09 RX ORDER — NALOXONE HYDROCHLORIDE 0.4 MG/ML
.1-.4 INJECTION, SOLUTION INTRAMUSCULAR; INTRAVENOUS; SUBCUTANEOUS
Status: DISCONTINUED | OUTPATIENT
Start: 2020-03-09 | End: 2020-03-09 | Stop reason: HOSPADM

## 2020-03-09 RX ORDER — MEPERIDINE HYDROCHLORIDE 50 MG/ML
12.5 INJECTION INTRAMUSCULAR; INTRAVENOUS; SUBCUTANEOUS
Status: DISCONTINUED | OUTPATIENT
Start: 2020-03-09 | End: 2020-03-09 | Stop reason: HOSPADM

## 2020-03-09 RX ORDER — NITROFURANTOIN MACROCRYSTAL 100 MG
100 CAPSULE ORAL 2 TIMES DAILY
COMMUNITY
Start: 2020-02-26 | End: 2020-03-09

## 2020-03-09 RX ORDER — ACETAMINOPHEN 325 MG/1
325 TABLET ORAL ONCE
Status: SHIPPED | OUTPATIENT
Start: 2020-03-09

## 2020-03-09 RX ORDER — ONDANSETRON 2 MG/ML
4 INJECTION INTRAMUSCULAR; INTRAVENOUS EVERY 30 MIN PRN
Status: DISCONTINUED | OUTPATIENT
Start: 2020-03-09 | End: 2020-03-09 | Stop reason: HOSPADM

## 2020-03-09 RX ORDER — ONDANSETRON 4 MG/1
4 TABLET, ORALLY DISINTEGRATING ORAL EVERY 30 MIN PRN
Status: DISCONTINUED | OUTPATIENT
Start: 2020-03-09 | End: 2020-03-09 | Stop reason: HOSPADM

## 2020-03-09 RX ADMIN — CEFAZOLIN 2 G: 10 INJECTION, POWDER, FOR SOLUTION INTRAVENOUS at 08:26

## 2020-03-09 RX ADMIN — METHOHEXITAL SODIUM 40 MG: 500 INJECTION, POWDER, LYOPHILIZED, FOR SOLUTION INTRAMUSCULAR; INTRAVENOUS; RECTAL at 08:38

## 2020-03-09 RX ADMIN — METHOHEXITAL SODIUM 30 MG: 500 INJECTION, POWDER, LYOPHILIZED, FOR SOLUTION INTRAMUSCULAR; INTRAVENOUS; RECTAL at 08:55

## 2020-03-09 RX ADMIN — METHOHEXITAL SODIUM 30 MG: 500 INJECTION, POWDER, LYOPHILIZED, FOR SOLUTION INTRAMUSCULAR; INTRAVENOUS; RECTAL at 08:45

## 2020-03-09 RX ADMIN — SODIUM CHLORIDE, POTASSIUM CHLORIDE, SODIUM LACTATE AND CALCIUM CHLORIDE: 600; 310; 30; 20 INJECTION, SOLUTION INTRAVENOUS at 08:10

## 2020-03-09 ASSESSMENT — MIFFLIN-ST. JEOR: SCORE: 1208.63

## 2020-03-09 ASSESSMENT — VISUAL ACUITY: OU: NORMAL ACUITY

## 2020-03-09 NOTE — IR NOTE
Patient Name: Slime Valle  Medical Record Number: [id number]  Today's Date: 3/9/2020    Procedure: right radiofrequency rhizotomy  Proceduralist: Dr DENY Pagan MD    Sedation Notes: MAC sedation with anesthesia     Procedure start time: 0838  Procedure end time: 0900  Out of room time: 0906    Report given to: PACU RN via CRNA  : none    Other Notes: Pt arrived to IR room 3 from pre op. Consent reviewed, pt confirmed. Pt denies any questions or concerns regarding procedure. Pt positioned supine and monitored per protocol. Site cleansed and dressed per protocol. Pt tolerated procedure without any noted complications. Pt transferred back to PACU with CRNA.

## 2020-03-09 NOTE — IP AVS SNAPSHOT
MRN:[id number]                      After Visit Summary   3/9/2020    Slime Valle    MRN: [id number]           Visit Information        Department      3/9/2020  6:06 AM [facility], [place], Interventional Radiology        Reason for your hospital stay      Facial pain            When to contact your care team      Please call or go to the closest Emergency Room if you have increased pain, redness, drainage, or swelling at your incision, temperature > 101.5 degrees Farenheit, changes in neurologic status (such as headache, lightheadedness, dizziness, confusion, or numbness, tingling, or weakness in your face, arms, or legs) or if you have any other questions or concerns.    You may reach the Neurosurgery clinic at [phone] during regular business hours. You can call the hospital  at [phone] and ask for the on-call Neurosurgery Resident at all other times.              Review of your medicines      CONTINUE these medicines which have NOT CHANGED       Dose / Directions   * amitriptyline 10 MG tablet  Commonly known as:  ELAVIL      Dose:  10 mg  Take 10 mg by mouth At Bedtime  Refills:  0     * amitriptyline 50 MG tablet  Commonly known as:  ELAVIL      Dose:  50 mg  Take 50 mg by mouth At Bedtime  Refills:  0     baclofen 10 MG tablet  Commonly known as:  LIORESAL  Indication:  EXCEPT IN THE MORNING WHEN SHE TAKES 2 TABS      Dose:  10 mg  Take 10 mg by mouth 4 times daily 20 mg in the morning, 10 mg at supper, and 10 mg at bedtime  Refills:  0     gabapentin 100 MG capsule  Commonly known as:  NEURONTIN      Dose:  100 mg  Take 100 mg by mouth 2 times daily  Refills:  0     Interferon Beta-1a 44 MCG/0.5ML Soaj      Dose:  44 mcg  Inject 44 mcg into the muscle three times a week  Refills:  0     Keppra 500 MG tablet  Generic drug:  levETIRAcetam      Dose:  500 mg  Take 500 mg by mouth 2 times daily Takes for pain control  Refills:  0     memantine 10 MG tablet  Commonly  known as:  NAMENDA      Dose:  10 mg  Take 10 mg by mouth 2 times daily  Refills:  0     nitroFURantoin macrocrystal 100 MG capsule  Commonly known as:  MACRODANTIN  Indication:  Urinary Tract Infection      Dose:  100 mg  Take 100 mg by mouth 2 times daily  Refills:  0     PHENYTOIN PO      Dose:  300 mg  Take 300 mg by mouth At Bedtime  Refills:  0     traMADol 50 MG tablet  Commonly known as:  ULTRAM  Indication:  Pain      Dose:  50 mg  Take 50 mg by mouth every 8 hours as needed for severe pain  Refills:  0     venlafaxine 225 MG 24 hr tablet  Commonly known as:  EFFEXOR-ER      Dose:  225 mg  Take 225 mg by mouth daily (with breakfast)  Refills:  0     vitamin D3 1000 units (25 mcg) tablet  Commonly known as:  CHOLECALCIFEROL      Dose:  1,000 Units  Take 1,000 Units by mouth 2 times daily  Refills:  0         * This list has 2 medication(s) that are the same as other medications prescribed for you. Read the directions carefully, and ask your doctor or other care provider to review them with you.                  Protect others around you: Learn how to safely use, store and throw away your medicines at www.disposemymeds.org.       Follow-ups after your visit       Follow-up Appointments     Adult [facility] Follow-up and recommended labs and tests      You should follow up with a provider in clinic for suture removal, wound check, and general post-operative care. You should call [phone] or [phone] if you have not heard from the hospital within 7 days of discharge regarding your follow-up appointment.    Appointments on [place] and/or [facility] (with [facility] or [facility] provider or service). Call [phone] if you haven't heard regarding these appointments within 7 days of discharge.           Your next 10 appointments already scheduled    Mar 23, 2020 12:30 PM CDT  (Arrive by 12:15 PM)  Return Visit with RUPERT Barrios [facility] ([facility]) [address] [facility] [address] [phone]         Care Instructions       After Care Instructions     Activity      No activity restrictions         Diet      Follow this diet upon discharge: regular         Wound care and dressings      You may take off your bandage (if applied) the next day. No specific cares other than occasionally washing face as per your regular routine.           Further instructions from your care team       [facility], [place]  Same-Day Surgery   Adult Discharge Orders & Instructions     For 24 hours after surgery    1. Get plenty of rest.  A responsible adult must stay with you for at least 24 hours after you leave the hospital.   2. Do not drive or use heavy equipment.  If you have weakness or tingling, don't drive or use heavy equipment until this feeling goes away.  3. Do not drink alcohol.  4. Avoid strenuous or risky activities.  Ask for help when climbing stairs.   5. You may feel lightheaded.  IF so, sit for a few minutes before standing.  Have someone help you get up.   6. If you have nausea (feel sick to your stomach): Drink only clear liquids such as apple juice, ginger ale, broth or 7-Up.  Rest may also help.  Be sure to drink enough fluids.  Move to a regular diet as you feel able.  7. You may have a slight fever. Call the doctor if your fever is over 100 F (37.7 C) (taken under the tongue) or lasts longer than 24 hours.  8. You may have a dry mouth, a sore throat, muscle aches or trouble sleeping.  These should go away after 24 hours.  9. Do not make important or legal decisions.   Call your doctor for any of the followin.  Signs of infection (fever, growing tenderness at the surgery site, a large amount of drainage or bleeding, severe pain, foul-smelling drainage, redness, swelling).    2. It has been over 8 to 10 hours since surgery and you are still not able to urinate (pass water).    3.  Headache for over 24  hours.    4.  Numbness, tingling or weakness the day after surgery (if you had spinal anesthesia).  To contact a doctor, call    Dr. Pagan's neurosurgery office at [phone]   or:        [phone] and ask for the resident on call for   neurosurgery (answered 24 hours a day)      Emergency Department:    [facility]: [phone]       (TTY for hearing impaired: [phone])    [facility]: [phone]       (TTY for hearing impaired: [phone])        Information about OPIOIDS    PRESCRIPTION OPIOIDS: WHAT YOU NEED TO KNOW   We gave you an opioid (narcotic) pain medicine. It is important to manage your pain, but opioids are not always the best choice. You should first try all the other options your care team gave you. Take this medicine for as short a time (and as few doses) as possible.    Some activities can increase your pain, such as bandage changes or therapy sessions. It may help to take your pain medicine 30 to 60 minutes before these activities. Reduce your stress by getting enough sleep, working on hobbies you enjoy and practicing relaxation or meditation. Talk to your care team about ways to manage your pain beyond prescription opioids.    These medicines have risks:    DO NOT drive when on new or higher doses of pain medicine. These medicines can affect your alertness and reaction times, and you could be arrested for driving under the influence (DUI). If you need to use opioids long-term, talk to your care team about driving.    DO NOT operate heavy machinery    DO NOT do any other dangerous activities while taking these medicines.    DO NOT drink any alcohol while taking these medicines.     If the opioid prescribed includes acetaminophen, DO NOT take with any other medicines that contain acetaminophen. Read all labels carefully. Look for the word  acetaminophen  or  Tylenol.  Ask your pharmacist if you have questions or are unsure.    You can get addicted to pain medicines,  especially if you have a history of addiction (chemical, alcohol or substance dependence). Talk to your care team about ways to reduce this risk.    All opioids tend to cause constipation. Drink plenty of water and eat foods that have a lot of fiber, such as fruits, vegetables, prune juice, apple juice and high-fiber cereal. Take a laxative (Miralax, milk of magnesia, Colace, Senna) if you don t move your bowels at least every other day. Other side effects include upset stomach, sleepiness, dizziness, throwing up, tolerance (needing more of the medicine to have the same effect), physical dependence and slowed breathing.    Store your pills in a secure place, locked if possible. We will not replace any lost or stolen medicine. If you don t finish your medicine, please throw away (dispose) as directed by your pharmacist. The [place] Pollution Control Agency has more information about safe disposal: [email or web address]://[email or web address]-[email or web address]/[email or web address]-unwanted-medications       Statement of Approval (From admission, onward)     Ordered          03/09/20 1024  I have reviewed and agree with all the recommendations and orders detailed in this document.  EFFECTIVE NOW     Approved and electronically signed by:  Jeremy Thapa MD                   Additional Information About Your Visit       [employer] Information    [facility] gives you secure access to your electronic health record. If you see a primary care provider, you can also send messages to your care team and make appointments. If you have questions, please call your primary care clinic.  If you do not have a primary care provider, please call [phone] and they will assist you.       Care EveryWhere ID    This is your Care EveryWhere ID. This could be used by other organizations to access your [facility] medical records  [id number]       Your Vitals Were  Most recent update: 3/9/2020 10:18 AM    Blood Pressure   98/67          Pulse   78          Temperature   97.8  F  "(36.6  C) (Oral)          Respirations   16          Height   1.702 m (5' 7\")             Weight   59.1 kg (130 lb 4.7 oz)    Pulse Oximetry   96%    BMI (Body Mass Index)   20.41 kg/m           Primary Care Provider Office Phone # Fax #    Gertrudis Perez [phone]      Equal Access to Services    [facility]: Hadii aad ku hadasho Soomaali, waaxda luqadaha, qaybta kaalmada adeegyada, waxay idiin hayaan adeeg khgermansh laFernandokarolinen . So [facility] [phone].    ATENCIÓN: Si habla espdionisio, tiene a hammonds disposición servicios gratuitos de asistencia lingüística. Llame al [phone].    We comply with applicable federal and state civil rights laws, including the [place] Human Rights Act. We do not discriminate on the basis of race, color, creed, Rastafarian, national origin, marital status, age, disability, sex, sexual orientation, or gender identity.       Thank you!    Thank you for choosing [facility] for your care. Our goal is always to provide you with excellent care. Hearing back from our patients is one way we can continue to improve our services. Please take a few minutes to complete the written survey that you may receive in the mail after you visit with us. Thank you!            Medication List      Medications       Morning Afternoon Evening Bedtime As Needed   * amitriptyline 10 MG tablet  Also known as:  ELAVIL  INSTRUCTIONS:  Take 10 mg by mouth At Bedtime                    * amitriptyline 50 MG tablet  Also known as:  ELAVIL  INSTRUCTIONS:  Take 50 mg by mouth At Bedtime                    baclofen 10 MG tablet  Also known as:  LIORESAL  INSTRUCTIONS:  Take 10 mg by mouth 4 times daily 20 mg in the morning, 10 mg at supper, and 10 mg at bedtime  Reason for med:  EXCEPT IN THE MORNING WHEN SHE TAKES 2 TABS                    gabapentin 100 MG capsule  Also known as:  NEURONTIN  INSTRUCTIONS:  Take 100 mg by mouth 2 times daily                    Interferon Beta-1a 44 MCG/0.5ML " Soaj  INSTRUCTIONS:  Inject 44 mcg into the muscle three times a week                    Keppra 500 MG tablet  INSTRUCTIONS:  Take 500 mg by mouth 2 times daily Takes for pain control  Generic drug:  levETIRAcetam                    memantine 10 MG tablet  Also known as:  NAMENDA  INSTRUCTIONS:  Take 10 mg by mouth 2 times daily                    nitroFURantoin macrocrystal 100 MG capsule  Also known as:  MACRODANTIN  INSTRUCTIONS:  Take 100 mg by mouth 2 times daily  Reason for med:  Urinary Tract Infection                    PHENYTOIN PO  INSTRUCTIONS:  Take 300 mg by mouth At Bedtime                    traMADol 50 MG tablet  Also known as:  ULTRAM  INSTRUCTIONS:  Take 50 mg by mouth every 8 hours as needed for severe pain  Reason for med:  Pain                    venlafaxine 225 MG 24 hr tablet  Also known as:  EFFEXOR-ER  INSTRUCTIONS:  Take 225 mg by mouth daily (with breakfast)                    vitamin D3 1000 units (25 mcg) tablet  Also known as:  CHOLECALCIFEROL  INSTRUCTIONS:  Take 1,000 Units by mouth 2 times daily                        * This list has 2 medication(s) that are the same as other medications prescribed for you. Read the directions carefully, and ask your doctor or other care provider to review them with you.

## 2020-03-09 NOTE — ANESTHESIA PREPROCEDURE EVALUATION
"Anesthesia Pre-Procedure Evaluation    Patient: Slime Valle   MRN:     [id number] Gender:   female   Age:    59 year old :      1960        Preoperative Diagnosis: Trigeminal neuralgia [G50.0]   Procedure(s):  ANESTHESIA OUT OF OR rhizotomy @ 8:00     LABS:  CBC:   Lab Results   Component Value Date    WBC 7.8 2020    HGB 14.6 2020    HCT 45.6 2020     2020     BMP:   Lab Results   Component Value Date     2020    POTASSIUM 3.9 2020    CHLORIDE 107 2020    CO2 PENDING 2020    BUN PENDING 2020    CR PENDING 2020    GLC PENDING 2020     COAGS:   Lab Results   Component Value Date    PTT 26 2020    INR 1.05 2020     POC:   Lab Results   Component Value Date    BGM 82 2020     OTHER:   Lab Results   Component Value Date    SHAYNA PENDING 2020        Preop Vitals    BP Readings from Last 3 Encounters:   20 104/61   19 108/68   02/15/18 117/78    Pulse Readings from Last 3 Encounters:   20 94   19 80   18 92      Resp Readings from Last 3 Encounters:   20 15   19 16   02/15/18 16    SpO2 Readings from Last 3 Encounters:   20 98%   19 95%   02/15/18 95%      Temp Readings from Last 1 Encounters:   20 36.9  C (98.4  F) (Oral)    Ht Readings from Last 1 Encounters:   20 1.702 m (5' 7\")      Wt Readings from Last 1 Encounters:   20 59.1 kg (130 lb 4.7 oz)    Estimated body mass index is 20.41 kg/m  as calculated from the following:    Height as of this encounter: 1.702 m (5' 7\").    Weight as of this encounter: 59.1 kg (130 lb 4.7 oz).     LDA:        No past medical history on file.   Past Surgical History:   Procedure Laterality Date     RHIZOLYSIS WITH RADIO FREQUENCY ABLATION Left 2/15/2018    Procedure: RHIZOLYSIS WITH RADIO FREQUENCY ABLATION;  Radio Frequency Percutaneous Left Trigeminal Rhizotomy;  Surgeon: Gene Mcclelland MD; "  Location: [facility] OR      Allergies   Allergen Reactions     Carbamazepine Other (See Comments)     Other reaction(s): Other (Specify in Comments)  Hallucinations, extreme weakness, confusion     Hydrocodone-Acetaminophen      Other reaction(s): Other (Specify in Comments)  Weakness, falls        Anesthesia Evaluation     . Pt has had prior anesthetic. Type: MAC and Regional           ROS/MED HX    ENT/Pulmonary:  - neg pulmonary ROS     Neurologic: Comment: L TGN s/p rhizotomy, now R TGN    (+)other neuro relapsom remitting MS 1985 on interferon injections and baclofen and multiple other pain meds    Cardiovascular:  - neg cardiovascular ROS       METS/Exercise Tolerance:  3 - Able to walk 1-2 blocks without stopping   Hematologic:     (+) Anemia, -      Musculoskeletal:  - neg musculoskeletal ROS       GI/Hepatic:  - neg GI/hepatic ROS   (+) Other GI/Hepatic G-tube in past       Renal/Genitourinary:  - ROS Renal section negative       Endo:  - neg endo ROS       Psychiatric:     (+) psychiatric history depression      Infectious Disease:  - neg infectious disease ROS       Malignancy:      - no malignancy   Other:    (+) No chance of pregnancy no H/O Chronic Pain,no other significant disability                        PHYSICAL EXAM:   Mental Status/Neuro:    Airway: Facies: Feasible  Mallampati: II  Mouth/Opening: Full  TM distance: > 6 cm  Neck ROM: Full   Respiratory: Auscultation: CTAB     Resp. Rate: Normal     Resp. Effort: Normal      CV: Rhythm: Regular  Rate: Age appropriate  Heart: Normal Sounds  Edema: None   Comments:                      Assessment:   ASA SCORE: 3    H&P: History and physical reviewed and following examination; no interval change.   Smoking Status:  Non-Smoker/Unknown   NPO Status: NPO Appropriate     Plan:   Anes. Type:  MAC   Pre-Medication: None (small doses Midazolam)   Induction:  N/a   Airway: Native Airway   Access/Monitoring: PIV   Maintenance: N/a (Methohexital at direction of  surgeon)     Postop Plan:   Postop Pain: None  Postop Sedation/Airway: Not planned     PONV Management:  NO PONV Prophylaxis Required     CONSENT: Direct conversation   Plan and risks discussed with: Patient                      Mayte Mcmanus MD

## 2020-03-09 NOTE — ANESTHESIA CARE TRANSFER NOTE
Patient: Slime Valle    Procedure(s):  ANESTHESIA OUT OF OR rhizotomy @ 8:00    Diagnosis: Trigeminal neuralgia [G50.0]  Diagnosis Additional Information: No value filed.    Anesthesia Type:   MAC     Note:  Airway :Nasal Cannula  Patient transferred to:Phase II  Comments: Patient awake and responsive. Transferred to phase 2 on 2L nasal cannula. Report to RN. Vital signs stable. Handoff Report: Identifed the Patient, Identified the Reponsible Provider, Reviewed the pertinent medical history, Discussed the surgical course, Reviewed Intra-OP anesthesia mangement and issues during anesthesia, Set expectations for post-procedure period and Allowed opportunity for questions and acknowledgement of understanding      Vitals: (Last set prior to Anesthesia Care Transfer)    CRNA VITALS  3/9/2020 0843 - 3/9/2020 0914      3/9/2020             Resp Rate (observed):  15    EKG:  NSR                Electronically Signed By: RUPERT Godfrey CRNA  March 9, 2020  9:14 AM

## 2020-03-09 NOTE — PROGRESS NOTES
Paged Dr Pagan for brief op note and discharge orders. Pt sitting up in chair and tolerating food and liquids.

## 2020-03-09 NOTE — BRIEF OP NOTE
[facility] Brief Operative Note    Pre-operative diagnosis: Trigeminal neuralgia [G50.0]   Post-operative diagnosis Trigeminal Neuralgia   Procedure: Procedure(s):  ANESTHESIA OUT OF OR rhizotomy @ 8:00   Surgeon(s): Surgeon(s) and Role:     * GENERIC ANESTHESIA PROVIDER - Primary     * Cipriano Pagan MD   Estimated blood loss: 1cc    Specimens: None   Findings: Successful procedure

## 2020-03-09 NOTE — IP AVS SNAPSHOT
[facility], [place], Interventional Radiology  [address] [address]  Phone:  [phone]                                    After Visit Summary   3/9/2020    Slime Valle    MRN: [id number]           After Visit Summary Signature Page    I have received my discharge instructions, and my questions have been answered. I have discussed any challenges I see with this plan with the nurse or doctor.    ..........................................................................................................................................  Patient/Patient Representative Signature      ..........................................................................................................................................  Patient Representative Print Name and Relationship to Patient    ..................................................               ................................................  Date                                   Time    ..........................................................................................................................................  Reviewed by Signature/Title    ...................................................              ..............................................  Date                                               Time          22EPIC Rev 08/18

## 2020-03-09 NOTE — ANESTHESIA POSTPROCEDURE EVALUATION
Anesthesia POST Procedure Evaluation    Patient: Slime Valle   MRN:     [id number] Gender:   female   Age:    59 year old :      1960        Preoperative Diagnosis: Trigeminal neuralgia [G50.0]   Procedure(s):  ANESTHESIA OUT OF OR rhizotomy @ 8:00   Postop Comments: No value filed.     Anesthesia Type: MAC       Disposition: Outpatient   Postop Pain Control: Uneventful            Sign Out: Well controlled pain   PONV: No   Neuro/Psych: Uneventful            Sign Out: Acceptable/Baseline neuro status   Airway/Respiratory: Uneventful            Sign Out: Acceptable/Baseline resp. status   CV/Hemodynamics: Uneventful            Sign Out: Acceptable CV status   Other NRE: NONE   DID A NON-ROUTINE EVENT OCCUR? No         Last Anesthesia Record Vitals:  CRNA VITALS  3/9/2020 0843 - 3/9/2020 0943      3/9/2020             Resp Rate (observed):  15    EKG:  NSR          Last PACU Vitals:  Vitals Value Taken Time   /72 3/9/2020  9:00 AM   Temp 36.5  C (97.7  F) 3/9/2020  9:00 AM   Pulse 78 3/9/2020  9:00 AM   Resp 16 3/9/2020  9:00 AM   SpO2 99 % 3/9/2020  9:00 AM   Temp src     NIBP 107/71 3/9/2020  9:01 AM   Pulse 77 3/9/2020  9:05 AM   SpO2 99 % 3/9/2020  9:05 AM   Resp     Temp     Ht Rate 79 3/9/2020  9:01 AM   Temp 2           Electronically Signed By: Mayte Mcmanus MD, 2020, 11:18 AM

## 2020-03-09 NOTE — DISCHARGE INSTRUCTIONS
[facility]  Same-Day Surgery   Adult Discharge Orders & Instructions     For 24 hours after surgery    1. Get plenty of rest.  A responsible adult must stay with you for at least 24 hours after you leave the hospital.   2. Do not drive or use heavy equipment.  If you have weakness or tingling, don't drive or use heavy equipment until this feeling goes away.  3. Do not drink alcohol.  4. Avoid strenuous or risky activities.  Ask for help when climbing stairs.   5. You may feel lightheaded.  IF so, sit for a few minutes before standing.  Have someone help you get up.   6. If you have nausea (feel sick to your stomach): Drink only clear liquids such as apple juice, ginger ale, broth or 7-Up.  Rest may also help.  Be sure to drink enough fluids.  Move to a regular diet as you feel able.  7. You may have a slight fever. Call the doctor if your fever is over 100 F (37.7 C) (taken under the tongue) or lasts longer than 24 hours.  8. You may have a dry mouth, a sore throat, muscle aches or trouble sleeping.  These should go away after 24 hours.  9. Do not make important or legal decisions.   Call your doctor for any of the followin.  Signs of infection (fever, growing tenderness at the surgery site, a large amount of drainage or bleeding, severe pain, foul-smelling drainage, redness, swelling).    2. It has been over 8 to 10 hours since surgery and you are still not able to urinate (pass water).    3.  Headache for over 24 hours.    4.  Numbness, tingling or weakness the day after surgery (if you had spinal anesthesia).  To contact a doctor, call    Dr. Pagan's neurosurgery office at [phone]   or:        [phone] and ask for the resident on call for   neurosurgery (answered 24 hours a day)      Emergency Department:    [facility]: [phone]       (TTY for hearing impaired: [phone])    [facility]: [phone]       (TTY for hearing impaired:  [phone])

## 2020-03-09 NOTE — OP NOTE
Procedure Date: 03/09/2020      SURGEON:  Cipriano Pagan MD      :  None.      PREOPERATIVE DIAGNOSIS:  Right-sided V2 trigeminal neuralgia.      POSTOPERATIVE DIAGNOSIS:  Right-sided V2 trigeminal neuralgia.      PROCEDURE PERFORMED:  Right-sided percutaneous stereotactic radiofrequency rhizotomy.      ANESTHESIA:  Local MAC.      ESTIMATED BLOOD LOSS:  Less than 1 mL.      COMPLICATIONS:  None immediately apparent.      PREOPERATIVE HISTORY:  Slime is a 59-year-old woman who has a history of multiple sclerosis and trigeminal neuralgia.  She does have bilateral trigeminal neuralgia and has had the left side treated before with multiple rhizotomies.  She has more recently developed pain in the right side of her face, and this has been refractory to medical therapy.  We did discuss treatment options and ultimately, she decided to proceed with radiofrequency rhizotomy.  She understood the risks and benefits included expected numbness and the risk with MS and bilateral disease of complete numbness on both sides leading to inability to eat.  With this in mind, our plan today was to make a single lesion and not try to make multiple lesions.      DESCRIPTION OF PROCEDURE:  Slime was brought to the angiography suite, positioned in a supine fashion on the angiography table.  Hartel's landmarks were marked on the right side of her face, which was then prepped and draped in the usual sterile fashion.  We then brought in our lateral fluoroscopy and had a true lateral view.  After a timeout, I then percutaneously placed the needle through the cheek up through the foramen ovale.  I then removed the inner stylet.  This was then replaced by Idalia electrode.  The initial impedance was around 180.  I had the electrode position just slightly distal to the clival line and rotated up.  When I stimulated, I was able to reproduce pain at approximately 0.2.  With this, we put her back to sleep with Brevital and made a  single lesion at 65 degrees for 90 seconds.      When she awoke, she still had pinprick sensation on both sides.  She was not experiencing any of her trigeminal pain.  Given the fact that she has bilateral trigeminal neuralgia, I wanted to be cognizant of not making her too numb.  At that point, I decided to stop the procedure, put her to sleep one more time, removed the needle, and then held pressure on her cheek for 3 minutes.      She was then taken to recovery.      I attest I was present for the entire duration of the procedure.         TARAH RAO MD             D: 2020   T: 2020   MT: KATE      Name:     OJHANN BUSH   MRN:      [id number]        Account:        [id number]   :      1960           Procedure Date: 2020      Document: [id number]

## 2020-07-28 ENCOUNTER — TELEPHONE (OUTPATIENT)
Dept: NEUROSURGERY | Facility: CLINIC | Age: 60
End: 2020-07-28

## 2020-07-28 NOTE — TELEPHONE ENCOUNTER
Spoke with Marvin, Patient having front teeth work to work on bridge taking out 2 front teeth.  Patient doing well at this time, no facial pain.  DOS 3/9/20 Rhizotomy completed.    Explained good to go for dental appointments.      Voices understanding.

## 2020-11-14 ENCOUNTER — HEALTH MAINTENANCE LETTER (OUTPATIENT)
Age: 60
End: 2020-11-14

## 2021-04-03 ENCOUNTER — HEALTH MAINTENANCE LETTER (OUTPATIENT)
Age: 61
End: 2021-04-03

## 2021-09-12 ENCOUNTER — HEALTH MAINTENANCE LETTER (OUTPATIENT)
Age: 61
End: 2021-09-12

## 2022-02-27 ENCOUNTER — HEALTH MAINTENANCE LETTER (OUTPATIENT)
Age: 62
End: 2022-02-27

## 2022-04-24 ENCOUNTER — HEALTH MAINTENANCE LETTER (OUTPATIENT)
Age: 62
End: 2022-04-24

## 2022-11-19 ENCOUNTER — HEALTH MAINTENANCE LETTER (OUTPATIENT)
Age: 62
End: 2022-11-19

## 2023-06-01 ENCOUNTER — HEALTH MAINTENANCE LETTER (OUTPATIENT)
Age: 63
End: 2023-06-01

## 2025-01-03 ENCOUNTER — TRANSFERRED RECORDS (OUTPATIENT)
Dept: HEALTH INFORMATION MANAGEMENT | Facility: CLINIC | Age: 65
End: 2025-01-03

## 2025-01-23 ENCOUNTER — TRANSFERRED RECORDS (OUTPATIENT)
Dept: HEALTH INFORMATION MANAGEMENT | Facility: CLINIC | Age: 65
End: 2025-01-23

## 2025-01-24 ENCOUNTER — TRANSFERRED RECORDS (OUTPATIENT)
Dept: HEALTH INFORMATION MANAGEMENT | Facility: CLINIC | Age: 65
End: 2025-01-24

## 2025-05-29 ENCOUNTER — TRANSCRIBE ORDERS (OUTPATIENT)
Dept: OTHER | Age: 65
End: 2025-05-29

## 2025-05-29 DIAGNOSIS — G50.0 TRIGEMINAL NEURALGIA: Primary | ICD-10-CM

## 2025-06-02 NOTE — TELEPHONE ENCOUNTER
Records Requested   June 2, 2025 10:02 AM   [id number]   Facility  [facility]   Outcome 10:04 am Sent request for imaging & clinic notes. -SUNIL    Abeba Blancas on 6/2/2025 at 3:53 PM Imaging resolved into PACS & notes sent to HIM scanning. -SUNIL     REASON FOR VISIT: Trigeminal neuralgia   PROVIDER: Marli Flood APRN CNP   DATE OF APPT: 6/20/25    NOTES (FOR ALL VISITS) STATUS DETAILS   OFFICE NOTE from referring provider In process 5/29/25 (Transcribed Orders) [facility]     OFFICE NOTE from other specialist Internal  12/3/19 [facility]    2/14/18 Gene Mcclelland MD @[facility]     HOSPITAL ENCOUNTERS Internal 3/9/20 Obed Ace DO @[facility]     2/15/18 Gene Mcclelland MD @[facility]    9/9/17 Latonia Miner MD  @[facility]     OPERATIVE REPORT Internal 3/9/20 Cipriano Pagan MD @[facility] ANESTHESIA OUT OF OR rhizotomy @ 8:00     2/15/18 Gene Mcclelland MD @[facility] Radio Frequency Percutaneous Left Trigeminal Rhizotomy      MEDICATION LIST Internal    IMAGING  (FOR ALL VISITS)     IR Internal [facility]  3/9/20 IR Rhizotomy     MRI (HEAD, NECK, SPINE) PACS [place]  1/23/25 MR Brain  9/30/24 MR Brain  5/15/23 MR Brain  11/7/22 MR Cervical Spine  11/7/22 MR Brain     CT (HEAD, NECK, SPINE) PACS [place]  1/3/25 CT Head  9/16/17 CT Facial Bones

## 2025-06-15 ENCOUNTER — HEALTH MAINTENANCE LETTER (OUTPATIENT)
Age: 65
End: 2025-06-15

## 2025-06-20 ENCOUNTER — PRE VISIT (OUTPATIENT)
Dept: NEUROSURGERY | Facility: CLINIC | Age: 65
End: 2025-06-20

## 2025-07-01 ENCOUNTER — OFFICE VISIT (OUTPATIENT)
Dept: NEUROSURGERY | Facility: CLINIC | Age: 65
End: 2025-07-01
Attending: PSYCHIATRY & NEUROLOGY
Payer: MEDICARE

## 2025-07-01 VITALS
SYSTOLIC BLOOD PRESSURE: 102 MMHG | OXYGEN SATURATION: 97 % | DIASTOLIC BLOOD PRESSURE: 56 MMHG | HEART RATE: 76 BPM | RESPIRATION RATE: 16 BRPM

## 2025-07-01 DIAGNOSIS — G50.0 TRIGEMINAL NEURALGIA: Primary | ICD-10-CM

## 2025-07-01 PROCEDURE — 99204 OFFICE O/P NEW MOD 45 MIN: CPT | Performed by: NURSE PRACTITIONER

## 2025-07-01 PROCEDURE — 3078F DIAST BP <80 MM HG: CPT | Performed by: NURSE PRACTITIONER

## 2025-07-01 PROCEDURE — 1126F AMNT PAIN NOTED NONE PRSNT: CPT | Performed by: NURSE PRACTITIONER

## 2025-07-01 PROCEDURE — 3074F SYST BP LT 130 MM HG: CPT | Performed by: NURSE PRACTITIONER

## 2025-07-01 RX ORDER — ALENDRONATE SODIUM 70 MG/1
TABLET ORAL
COMMUNITY
Start: 2025-05-18

## 2025-07-01 ASSESSMENT — PAIN SCALES - GENERAL: PAINLEVEL_OUTOF10: NO PAIN (0)

## 2025-07-01 NOTE — PROGRESS NOTES
[facility]  Department of Neurosurgery      Name: Slime Valle  MRN: [id number]  Age: 65 year old  : 1960  Referring provider: Marvin Echeverria  2025      Chief Complaint:   Right Trigeminal neuralgia  New patient    History of Present Illness:   Slime Valle is a 65 year old female with a history of Relapsing Remitting MS, secondary Trigeminal neuralgia who is seen today to establish care with our system. Previously seen by Rachel Mcclelland and Ej.     Patient has a long term h/o Multiple Sclerosis, initial diagnosis in  and has been on Rebif. She follows up with a neurologist at [facility]. She initially developed paroxysmal lancinating pains in the left V2 and V3 distributions in 2014. Patient was seen by Dr. Mcclelland in the past and underwent  Left radiofrequency Rhizotomy in 2018. Patient has been TN symptom free on the left side ever since that procedure.     In , she started having TN symptoms on the right side of her face and was evaluated by Dr. Pagan. Subsequently underwent right RFR in 3/2020. Patient was symptoms free for almost 3 + years. She started having recurrent TN symptoms on the right side of her face last year. She underwent a few pain blocks at [facility] and subsequently underwent Right sided Gamma knife procedure in 2025 through [facility]. Patient had ongoing TN symptoms post Gamma knife until last week of May, 2025. For the past 1 month, she has not had any symptoms. She denies any residual numbness on her face from prior procedures.     She remains on gabapentin [id number], Levetiracetam 500-500 and baclofen 20-10-10. She reports fatigue and cognitive slowing as possible side effects. She follows up with a neurology KARLO in [place]. As she is TN symptom free, she would like to wean down the medications. She would also like to reestablish care with our clinic and would like to have a repeat RFR if she were to have recurrent  facial pain.     Review of Systems:   Pertinent items are noted in HPI or as in patient entered ROS below, remainder of complete ROS is negative.        No data to display                 Physical Exam:   /56   Pulse 76   Resp 16   SpO2 97%    General: No acute distress.    Neuro: The patient is oriented to self, and place. Speech is slightly slow. Facial sensation is intact in V1, V2, V3 distributions. Facial nerve function is normal, rated as a House Brackmann 1.  No dysmetria with finger-nose-finger testing. Gait is normal.  Psych: Normal mood and affect. Behavior is normal.        Imagin2025: See PACS    Assessment:  Right Trigeminal neuralgia  New patient    Plan:  65 year old female with bilateral trigeminal neuralgia (most recently right TN) secondary from Relapsing Remitting MS. She had a Gamma knife procedure in 2025. Until end of May, she had TN symptoms, but none since . Patient would like to wean off of Gabapentin and Levetiracetam as she is symptom free. She has a local Neurology provider and I recommended her to reach out to them for medication management.     I would consider keeping her on her current medications for one more month and if she remains TN symptoms free, wean down Levetiracetam first followed by gabapentin. If she were to have recurrent symptoms, she could be a candidate for repeat RFR. Patient was encouraged to reach out to us either by mychart or by phone if she has recurrent symptoms. They agree with this plan.        I spent 45 minutes on patient care activities related to this encounter on the date of service, including time spent reviewing the chart, obtaining history and examination and in counseling the patient, and in documentation in the electronic medical record.      Marli EMERY, CNP  Department of Neurosurgery

## 2025-07-01 NOTE — PATIENT INSTRUCTIONS
Continue current medications for now. If you remain TN symptom free, please reach out to your local neurologist for recommendation for medication titration.     Please contact us if you have recurrent symptoms and would like to proceed with repeat Radiofrequency Rhizotomy.

## 2025-07-03 ENCOUNTER — DOCUMENTATION ONLY (OUTPATIENT)
Dept: NEUROSURGERY | Facility: CLINIC | Age: 65
End: 2025-07-03
Payer: MEDICARE

## 2025-07-03 NOTE — PROGRESS NOTES
Neuroscience Clinic Task Note    TASK    Fax/Scan  Document Marli Flood last office visit note   Destination [facility], ATTN: Ms. BaltazarNOMI at [phone].    Date/time sent 7/3/2025 at 10:17 AM        FOLLOW-UP  Marli Flood CNP last office note faxed per patient request.     ADDITIONAL COMMENTS       Sheba Ballard LPN

## 2025-07-06 ENCOUNTER — HEALTH MAINTENANCE LETTER (OUTPATIENT)
Age: 65
End: 2025-07-06

## 2025-07-30 ENCOUNTER — TELEPHONE (OUTPATIENT)
Dept: NEUROSURGERY | Facility: CLINIC | Age: 65
End: 2025-07-30
Payer: MEDICARE

## 2025-07-30 NOTE — TELEPHONE ENCOUNTER
REASON FOR VISIT: Trigeminal Neuralgia   PROVIDER: Cipriano Pagan MD   DATE OF APPT: 8/6/25    NOTES (FOR ALL VISITS) STATUS DETAILS   OFFICE NOTE from referring provider Internal 7/1/25 Marli Flood APRN CNP @[facility]     OFFICE NOTE from other specialist Care Everywhere 5/29/25 (Transcribed Orders), 1/23/25, 1/3/25 (Transferred Recs) [facility]     12/3/19 [facility]     2/14/18 Gene Mcclelland MD @[facility]     HOSPITAL ENCOUNTERS Care Everywhere 3/9/20 Obed Ace DO @[facility]      2/15/18 Gene Mcclelland MD @[facility]     9/9/17 Latonia Miner MD  @[facility]     OPERATIVE REPORT Care Everywhere 3/9/20 Cipriano Pagan MD @[facility] ANESTHESIA OUT OF OR rhizotomy @ 8:00      2/15/18 Gene Mcclelland MD @[facility] Radio Frequency Percutaneous Left Trigeminal Rhizotomy      MEDICATION LIST Internal    IMAGING  (FOR ALL VISITS)     IR Internal [facility]  3/9/20 IR Rhizotomy     MRI (HEAD, NECK, SPINE) Internal [place]  1/23/25 MR Brain  9/30/24 MR Brain  5/15/23 MR Brain  11/7/22 MR Cervical Spine  11/7/22 MR Brain     CT (HEAD, NECK, SPINE) Internal [place]  1/3/25 CT Head  9/16/17 CT Facial Bones

## 2025-08-06 ENCOUNTER — PRE VISIT (OUTPATIENT)
Dept: NEUROSURGERY | Facility: CLINIC | Age: 65
End: 2025-08-06

## 2025-08-06 ENCOUNTER — VIRTUAL VISIT (OUTPATIENT)
Dept: NEUROSURGERY | Facility: CLINIC | Age: 65
End: 2025-08-06
Payer: MEDICARE

## 2025-08-06 DIAGNOSIS — G50.0 TRIGEMINAL NEURALGIA: Primary | ICD-10-CM

## 2025-08-06 PROCEDURE — 98006 SYNCH AUDIO-VIDEO EST MOD 30: CPT | Performed by: NEUROLOGICAL SURGERY

## 2025-08-06 PROCEDURE — 1125F AMNT PAIN NOTED PAIN PRSNT: CPT | Performed by: NEUROLOGICAL SURGERY

## 2025-08-07 ENCOUNTER — HOSPITAL ENCOUNTER (INPATIENT)
Facility: CLINIC | Age: 65
End: 2025-08-07
Attending: EMERGENCY MEDICINE | Admitting: NEUROLOGICAL SURGERY
Payer: MEDICARE

## 2025-08-07 VITALS
HEART RATE: 72 BPM | RESPIRATION RATE: 15 BRPM | DIASTOLIC BLOOD PRESSURE: 75 MMHG | HEIGHT: 67 IN | BODY MASS INDEX: 21.19 KG/M2 | OXYGEN SATURATION: 99 % | WEIGHT: 135 LBS | SYSTOLIC BLOOD PRESSURE: 122 MMHG | TEMPERATURE: 98.3 F

## 2025-08-07 DIAGNOSIS — G50.0 TRIGEMINAL NEURALGIA: Primary | ICD-10-CM

## 2025-08-07 DIAGNOSIS — G35 MULTIPLE SCLEROSIS (H): ICD-10-CM

## 2025-08-07 DIAGNOSIS — G50.0 SECONDARY TRIGEMINAL NEURALGIA: ICD-10-CM

## 2025-08-07 LAB
ABO + RH BLD: NORMAL
ANION GAP SERPL CALCULATED.3IONS-SCNC: 14 MMOL/L (ref 7–15)
BASOPHILS # BLD AUTO: 0 10E3/UL (ref 0–0.2)
BASOPHILS NFR BLD AUTO: 1 %
BLD GP AB SCN SERPL QL: NEGATIVE
BUN SERPL-MCNC: 41.1 MG/DL (ref 8–23)
CALCIUM SERPL-MCNC: 10 MG/DL (ref 8.8–10.4)
CHLORIDE SERPL-SCNC: 103 MMOL/L (ref 98–107)
CREAT SERPL-MCNC: 0.69 MG/DL (ref 0.51–0.95)
EGFRCR SERPLBLD CKD-EPI 2021: >90 ML/MIN/1.73M2
EOSINOPHIL # BLD AUTO: 0.1 10E3/UL (ref 0–0.7)
EOSINOPHIL NFR BLD AUTO: 1 %
ERYTHROCYTE [DISTWIDTH] IN BLOOD BY AUTOMATED COUNT: 13 % (ref 10–15)
GLUCOSE SERPL-MCNC: 92 MG/DL (ref 70–99)
HCO3 SERPL-SCNC: 26 MMOL/L (ref 22–29)
HCT VFR BLD AUTO: 42.1 % (ref 35–47)
HGB BLD-MCNC: 13.4 G/DL (ref 11.7–15.7)
IMM GRANULOCYTES # BLD: 0 10E3/UL
IMM GRANULOCYTES NFR BLD: 0 %
LYMPHOCYTES # BLD AUTO: 2.5 10E3/UL (ref 0.8–5.3)
LYMPHOCYTES NFR BLD AUTO: 34 %
MAGNESIUM SERPL-MCNC: 2.5 MG/DL (ref 1.7–2.3)
MCH RBC QN AUTO: 29.5 PG (ref 26.5–33)
MCHC RBC AUTO-ENTMCNC: 31.8 G/DL (ref 31.5–36.5)
MCV RBC AUTO: 93 FL (ref 78–100)
MONOCYTES # BLD AUTO: 0.6 10E3/UL (ref 0–1.3)
MONOCYTES NFR BLD AUTO: 8 %
NEUTROPHILS # BLD AUTO: 4.1 10E3/UL (ref 1.6–8.3)
NEUTROPHILS NFR BLD AUTO: 56 %
NRBC # BLD AUTO: 0 10E3/UL
NRBC BLD AUTO-RTO: 0 /100
PHOSPHATE SERPL-MCNC: 3.3 MG/DL (ref 2.5–4.5)
PLATELET # BLD AUTO: 206 10E3/UL (ref 150–450)
POTASSIUM SERPL-SCNC: 4 MMOL/L (ref 3.4–5.3)
RBC # BLD AUTO: 4.55 10E6/UL (ref 3.8–5.2)
SODIUM SERPL-SCNC: 143 MMOL/L (ref 135–145)
SPECIMEN EXP DATE BLD: NORMAL
WBC # BLD AUTO: 7.2 10E3/UL (ref 4–11)

## 2025-08-07 PROCEDURE — 96361 HYDRATE IV INFUSION ADD-ON: CPT | Performed by: EMERGENCY MEDICINE

## 2025-08-07 PROCEDURE — 250N000013 HC RX MED GY IP 250 OP 250 PS 637: Performed by: NEUROLOGICAL SURGERY

## 2025-08-07 PROCEDURE — 96374 THER/PROPH/DIAG INJ IV PUSH: CPT | Performed by: EMERGENCY MEDICINE

## 2025-08-07 PROCEDURE — 258N000003 HC RX IP 258 OP 636

## 2025-08-07 PROCEDURE — 36415 COLL VENOUS BLD VENIPUNCTURE: CPT | Performed by: EMERGENCY MEDICINE

## 2025-08-07 PROCEDURE — 99285 EMERGENCY DEPT VISIT HI MDM: CPT | Performed by: EMERGENCY MEDICINE

## 2025-08-07 PROCEDURE — 250N000013 HC RX MED GY IP 250 OP 250 PS 637

## 2025-08-07 PROCEDURE — 83735 ASSAY OF MAGNESIUM: CPT

## 2025-08-07 PROCEDURE — 99285 EMERGENCY DEPT VISIT HI MDM: CPT | Mod: 25 | Performed by: EMERGENCY MEDICINE

## 2025-08-07 PROCEDURE — 86900 BLOOD TYPING SEROLOGIC ABO: CPT

## 2025-08-07 PROCEDURE — 258N000003 HC RX IP 258 OP 636: Performed by: EMERGENCY MEDICINE

## 2025-08-07 PROCEDURE — 84100 ASSAY OF PHOSPHORUS: CPT

## 2025-08-07 PROCEDURE — 250N000011 HC RX IP 250 OP 636: Performed by: EMERGENCY MEDICINE

## 2025-08-07 PROCEDURE — 85025 COMPLETE CBC W/AUTO DIFF WBC: CPT | Performed by: EMERGENCY MEDICINE

## 2025-08-07 PROCEDURE — 120N000002 HC R&B MED SURG/OB UMMC

## 2025-08-07 PROCEDURE — 250N000011 HC RX IP 250 OP 636

## 2025-08-07 PROCEDURE — 80048 BASIC METABOLIC PNL TOTAL CA: CPT | Performed by: EMERGENCY MEDICINE

## 2025-08-07 RX ORDER — ONDANSETRON 4 MG/1
4 TABLET, ORALLY DISINTEGRATING ORAL EVERY 6 HOURS PRN
Status: DISCONTINUED | OUTPATIENT
Start: 2025-08-07 | End: 2025-08-09 | Stop reason: HOSPADM

## 2025-08-07 RX ORDER — TRAMADOL HYDROCHLORIDE 50 MG/1
50 TABLET ORAL EVERY 8 HOURS PRN
Status: DISCONTINUED | OUTPATIENT
Start: 2025-08-07 | End: 2025-08-07

## 2025-08-07 RX ORDER — BACLOFEN 10 MG/1
10 TABLET ORAL 2 TIMES DAILY
Status: DISCONTINUED | OUTPATIENT
Start: 2025-08-07 | End: 2025-08-09 | Stop reason: HOSPADM

## 2025-08-07 RX ORDER — MEMANTINE HYDROCHLORIDE 10 MG/1
10 TABLET ORAL 2 TIMES DAILY
Status: DISCONTINUED | OUTPATIENT
Start: 2025-08-07 | End: 2025-08-09 | Stop reason: HOSPADM

## 2025-08-07 RX ORDER — DEXTROSE MONOHYDRATE AND SODIUM CHLORIDE 5; .9 G/100ML; G/100ML
INJECTION, SOLUTION INTRAVENOUS CONTINUOUS
Status: DISCONTINUED | OUTPATIENT
Start: 2025-08-07 | End: 2025-08-09 | Stop reason: HOSPADM

## 2025-08-07 RX ORDER — HYDROMORPHONE HCL IN WATER/PF 6 MG/30 ML
0.2 PATIENT CONTROLLED ANALGESIA SYRINGE INTRAVENOUS EVERY 4 HOURS PRN
Status: DISCONTINUED | OUTPATIENT
Start: 2025-08-07 | End: 2025-08-09 | Stop reason: HOSPADM

## 2025-08-07 RX ORDER — AMITRIPTYLINE HYDROCHLORIDE 75 MG/1
75 TABLET ORAL AT BEDTIME
Status: DISCONTINUED | OUTPATIENT
Start: 2025-08-07 | End: 2025-08-09 | Stop reason: HOSPADM

## 2025-08-07 RX ORDER — KETOROLAC TROMETHAMINE 30 MG/ML
30 INJECTION, SOLUTION INTRAMUSCULAR; INTRAVENOUS ONCE
Status: COMPLETED | OUTPATIENT
Start: 2025-08-07 | End: 2025-08-07

## 2025-08-07 RX ORDER — ALENDRONATE SODIUM 70 MG/1
70 TABLET ORAL WEEKLY
Status: DISCONTINUED | OUTPATIENT
Start: 2025-08-07 | End: 2025-08-07

## 2025-08-07 RX ORDER — BACLOFEN 20 MG/1
20 TABLET ORAL EVERY MORNING
Status: DISCONTINUED | OUTPATIENT
Start: 2025-08-08 | End: 2025-08-09 | Stop reason: HOSPADM

## 2025-08-07 RX ORDER — GABAPENTIN 400 MG/1
800 CAPSULE ORAL 3 TIMES DAILY
Status: DISCONTINUED | OUTPATIENT
Start: 2025-08-07 | End: 2025-08-09 | Stop reason: HOSPADM

## 2025-08-07 RX ORDER — ACETAMINOPHEN 325 MG/1
325 TABLET ORAL EVERY 4 HOURS PRN
Status: DISCONTINUED | OUTPATIENT
Start: 2025-08-07 | End: 2025-08-09 | Stop reason: HOSPADM

## 2025-08-07 RX ORDER — VITAMIN B COMPLEX
1000 TABLET ORAL 2 TIMES DAILY
Status: DISCONTINUED | OUTPATIENT
Start: 2025-08-07 | End: 2025-08-09 | Stop reason: HOSPADM

## 2025-08-07 RX ORDER — ONDANSETRON 2 MG/ML
4 INJECTION INTRAMUSCULAR; INTRAVENOUS EVERY 6 HOURS PRN
Status: DISCONTINUED | OUTPATIENT
Start: 2025-08-07 | End: 2025-08-09 | Stop reason: HOSPADM

## 2025-08-07 RX ORDER — GABAPENTIN 800 MG/1
1 TABLET ORAL
COMMUNITY
Start: 2025-07-18

## 2025-08-07 RX ORDER — PROCHLORPERAZINE MALEATE 10 MG
10 TABLET ORAL EVERY 6 HOURS PRN
Status: DISCONTINUED | OUTPATIENT
Start: 2025-08-07 | End: 2025-08-09 | Stop reason: HOSPADM

## 2025-08-07 RX ORDER — VENLAFAXINE HYDROCHLORIDE 225 MG/1
225 TABLET, EXTENDED RELEASE ORAL
Status: DISCONTINUED | OUTPATIENT
Start: 2025-08-08 | End: 2025-08-07

## 2025-08-07 RX ORDER — VENLAFAXINE HYDROCHLORIDE 225 MG/1
225 TABLET, EXTENDED RELEASE ORAL EVERY EVENING
Status: DISCONTINUED | OUTPATIENT
Start: 2025-08-07 | End: 2025-08-09 | Stop reason: HOSPADM

## 2025-08-07 RX ORDER — LEVETIRACETAM 500 MG/1
500 TABLET ORAL 2 TIMES DAILY
Status: DISCONTINUED | OUTPATIENT
Start: 2025-08-07 | End: 2025-08-09 | Stop reason: HOSPADM

## 2025-08-07 RX ADMIN — Medication 1000 UNITS: at 20:41

## 2025-08-07 RX ADMIN — MEMANTINE 10 MG: 10 TABLET ORAL at 20:41

## 2025-08-07 RX ADMIN — VENLAFAXINE HYDROCHLORIDE 225 MG: 225 TABLET, EXTENDED RELEASE ORAL at 20:42

## 2025-08-07 RX ADMIN — SODIUM CHLORIDE 1000 ML: 0.9 INJECTION, SOLUTION INTRAVENOUS at 13:46

## 2025-08-07 RX ADMIN — DEXTROSE AND SODIUM CHLORIDE: 5; .9 INJECTION, SOLUTION INTRAVENOUS at 23:32

## 2025-08-07 RX ADMIN — Medication 50 MG: at 20:42

## 2025-08-07 RX ADMIN — LEVETIRACETAM 500 MG: 500 TABLET, FILM COATED ORAL at 20:40

## 2025-08-07 RX ADMIN — SODIUM CHLORIDE, SODIUM LACTATE, POTASSIUM CHLORIDE, AND CALCIUM CHLORIDE 500 ML: .6; .31; .03; .02 INJECTION, SOLUTION INTRAVENOUS at 17:11

## 2025-08-07 RX ADMIN — GABAPENTIN 800 MG: 400 CAPSULE ORAL at 20:40

## 2025-08-07 RX ADMIN — KETOROLAC TROMETHAMINE 30 MG: 30 INJECTION, SOLUTION INTRAMUSCULAR at 13:46

## 2025-08-07 RX ADMIN — SODIUM CHLORIDE 900 MG PE: 9 INJECTION, SOLUTION INTRAVENOUS at 20:42

## 2025-08-07 ASSESSMENT — COLUMBIA-SUICIDE SEVERITY RATING SCALE - C-SSRS
6. HAVE YOU EVER DONE ANYTHING, STARTED TO DO ANYTHING, OR PREPARED TO DO ANYTHING TO END YOUR LIFE?: NO
2. HAVE YOU ACTUALLY HAD ANY THOUGHTS OF KILLING YOURSELF IN THE PAST MONTH?: NO
1. IN THE PAST MONTH, HAVE YOU WISHED YOU WERE DEAD OR WISHED YOU COULD GO TO SLEEP AND NOT WAKE UP?: NO

## 2025-08-07 ASSESSMENT — ACTIVITIES OF DAILY LIVING (ADL)
ADLS_ACUITY_SCORE: 41
ADLS_ACUITY_SCORE: 43
ADLS_ACUITY_SCORE: 43
ADLS_ACUITY_SCORE: 41
ADLS_ACUITY_SCORE: 43

## 2025-08-08 LAB
ANION GAP SERPL CALCULATED.3IONS-SCNC: 8 MMOL/L (ref 7–15)
BUN SERPL-MCNC: 34.6 MG/DL (ref 8–23)
CALCIUM SERPL-MCNC: 8.7 MG/DL (ref 8.8–10.4)
CHLORIDE SERPL-SCNC: 109 MMOL/L (ref 98–107)
CREAT SERPL-MCNC: 0.68 MG/DL (ref 0.51–0.95)
EGFRCR SERPLBLD CKD-EPI 2021: >90 ML/MIN/1.73M2
ERYTHROCYTE [DISTWIDTH] IN BLOOD BY AUTOMATED COUNT: 12.7 % (ref 10–15)
GLUCOSE SERPL-MCNC: 89 MG/DL (ref 70–99)
HCO3 SERPL-SCNC: 25 MMOL/L (ref 22–29)
HCT VFR BLD AUTO: 36.2 % (ref 35–47)
HGB BLD-MCNC: 11.4 G/DL (ref 11.7–15.7)
HOLD SPECIMEN: NORMAL
MAGNESIUM SERPL-MCNC: 2.3 MG/DL (ref 1.7–2.3)
MCH RBC QN AUTO: 29.8 PG (ref 26.5–33)
MCHC RBC AUTO-ENTMCNC: 31.5 G/DL (ref 31.5–36.5)
MCV RBC AUTO: 95 FL (ref 78–100)
PHOSPHATE SERPL-MCNC: 2.8 MG/DL (ref 2.5–4.5)
PLATELET # BLD AUTO: 150 10E3/UL (ref 150–450)
POTASSIUM SERPL-SCNC: 3.8 MMOL/L (ref 3.4–5.3)
RBC # BLD AUTO: 3.83 10E6/UL (ref 3.8–5.2)
SODIUM SERPL-SCNC: 142 MMOL/L (ref 135–145)
WBC # BLD AUTO: 5.2 10E3/UL (ref 4–11)

## 2025-08-08 PROCEDURE — 120N000002 HC R&B MED SURG/OB UMMC

## 2025-08-08 PROCEDURE — 258N000003 HC RX IP 258 OP 636

## 2025-08-08 PROCEDURE — 250N000013 HC RX MED GY IP 250 OP 250 PS 637

## 2025-08-08 PROCEDURE — 250N000013 HC RX MED GY IP 250 OP 250 PS 637: Performed by: NEUROLOGICAL SURGERY

## 2025-08-08 PROCEDURE — 250N000011 HC RX IP 250 OP 636

## 2025-08-08 PROCEDURE — 84100 ASSAY OF PHOSPHORUS: CPT

## 2025-08-08 PROCEDURE — 85014 HEMATOCRIT: CPT

## 2025-08-08 PROCEDURE — 36415 COLL VENOUS BLD VENIPUNCTURE: CPT

## 2025-08-08 PROCEDURE — 83735 ASSAY OF MAGNESIUM: CPT

## 2025-08-08 PROCEDURE — 80048 BASIC METABOLIC PNL TOTAL CA: CPT

## 2025-08-08 RX ORDER — GABAPENTIN 400 MG/1
800 CAPSULE ORAL 3 TIMES DAILY
COMMUNITY
Start: 2025-08-08

## 2025-08-08 RX ORDER — MULTIPLE VITAMINS W/ MINERALS TAB 9MG-400MCG
1 TAB ORAL 2 TIMES DAILY
Status: DISCONTINUED | OUTPATIENT
Start: 2025-08-08 | End: 2025-08-09 | Stop reason: HOSPADM

## 2025-08-08 RX ORDER — BACLOFEN 20 MG/1
20 TABLET ORAL EVERY MORNING
COMMUNITY
Start: 2025-08-09

## 2025-08-08 RX ORDER — CARBAMAZEPINE 100 MG/1
50 TABLET, CHEWABLE ORAL 3 TIMES DAILY
Qty: 90 TABLET | Refills: 0 | Status: SHIPPED | OUTPATIENT
Start: 2025-08-08

## 2025-08-08 RX ORDER — AMITRIPTYLINE HYDROCHLORIDE 75 MG/1
75 TABLET ORAL AT BEDTIME
COMMUNITY
Start: 2025-08-08

## 2025-08-08 RX ORDER — BACLOFEN 10 MG/1
10 TABLET ORAL 2 TIMES DAILY
COMMUNITY
Start: 2025-08-08

## 2025-08-08 RX ADMIN — BACLOFEN 20 MG: 20 TABLET ORAL at 08:16

## 2025-08-08 RX ADMIN — VENLAFAXINE HYDROCHLORIDE 225 MG: 225 TABLET, EXTENDED RELEASE ORAL at 19:45

## 2025-08-08 RX ADMIN — BACLOFEN 10 MG: 10 TABLET ORAL at 22:35

## 2025-08-08 RX ADMIN — Medication 1 TABLET: at 19:44

## 2025-08-08 RX ADMIN — LEVETIRACETAM 500 MG: 500 TABLET, FILM COATED ORAL at 08:14

## 2025-08-08 RX ADMIN — Medication 1000 UNITS: at 08:14

## 2025-08-08 RX ADMIN — Medication 1 TABLET: at 14:45

## 2025-08-08 RX ADMIN — LEVETIRACETAM 500 MG: 500 TABLET, FILM COATED ORAL at 19:44

## 2025-08-08 RX ADMIN — ONDANSETRON 4 MG: 2 INJECTION INTRAMUSCULAR; INTRAVENOUS at 00:03

## 2025-08-08 RX ADMIN — MEMANTINE 10 MG: 10 TABLET ORAL at 19:45

## 2025-08-08 RX ADMIN — GABAPENTIN 800 MG: 400 CAPSULE ORAL at 08:17

## 2025-08-08 RX ADMIN — MEMANTINE 10 MG: 10 TABLET ORAL at 08:15

## 2025-08-08 RX ADMIN — BACLOFEN 10 MG: 10 TABLET ORAL at 18:42

## 2025-08-08 RX ADMIN — AMITRIPTYLINE HYDROCHLORIDE 75 MG: 75 TABLET, FILM COATED ORAL at 00:04

## 2025-08-08 RX ADMIN — Medication 50 MG: at 08:17

## 2025-08-08 RX ADMIN — Medication 50 MG: at 14:44

## 2025-08-08 RX ADMIN — Medication 1000 UNITS: at 19:43

## 2025-08-08 RX ADMIN — Medication 50 MG: at 19:44

## 2025-08-08 RX ADMIN — AMITRIPTYLINE HYDROCHLORIDE 75 MG: 75 TABLET, FILM COATED ORAL at 22:35

## 2025-08-08 RX ADMIN — GABAPENTIN 800 MG: 400 CAPSULE ORAL at 14:45

## 2025-08-08 RX ADMIN — DEXTROSE AND SODIUM CHLORIDE: 5; .9 INJECTION, SOLUTION INTRAVENOUS at 20:53

## 2025-08-08 RX ADMIN — GABAPENTIN 800 MG: 400 CAPSULE ORAL at 19:43

## 2025-08-08 RX ADMIN — BACLOFEN 10 MG: 10 TABLET ORAL at 00:05

## 2025-08-08 ASSESSMENT — ACTIVITIES OF DAILY LIVING (ADL)
ADLS_ACUITY_SCORE: 50
ADLS_ACUITY_SCORE: 43
ADLS_ACUITY_SCORE: 50
ADLS_ACUITY_SCORE: 43
ADLS_ACUITY_SCORE: 50
ADLS_ACUITY_SCORE: 43
ADLS_ACUITY_SCORE: 50
ADLS_ACUITY_SCORE: 43
ADLS_ACUITY_SCORE: 50
ADLS_ACUITY_SCORE: 50
ADLS_ACUITY_SCORE: 43
ADLS_ACUITY_SCORE: 50
ADLS_ACUITY_SCORE: 43

## 2025-08-09 VITALS
TEMPERATURE: 98.4 F | RESPIRATION RATE: 16 BRPM | WEIGHT: 135 LBS | OXYGEN SATURATION: 100 % | HEART RATE: 67 BPM | SYSTOLIC BLOOD PRESSURE: 133 MMHG | DIASTOLIC BLOOD PRESSURE: 82 MMHG | HEIGHT: 67 IN | BODY MASS INDEX: 21.19 KG/M2

## 2025-08-09 LAB
ANION GAP SERPL CALCULATED.3IONS-SCNC: 9 MMOL/L (ref 7–15)
BUN SERPL-MCNC: 22.7 MG/DL (ref 8–23)
CALCIUM SERPL-MCNC: 8.9 MG/DL (ref 8.8–10.4)
CHLORIDE SERPL-SCNC: 111 MMOL/L (ref 98–107)
CREAT SERPL-MCNC: 0.63 MG/DL (ref 0.51–0.95)
EGFRCR SERPLBLD CKD-EPI 2021: >90 ML/MIN/1.73M2
ERYTHROCYTE [DISTWIDTH] IN BLOOD BY AUTOMATED COUNT: 12.9 % (ref 10–15)
GLUCOSE SERPL-MCNC: 101 MG/DL (ref 70–99)
HCO3 SERPL-SCNC: 25 MMOL/L (ref 22–29)
HCT VFR BLD AUTO: 37.1 % (ref 35–47)
HGB BLD-MCNC: 11.7 G/DL (ref 11.7–15.7)
MAGNESIUM SERPL-MCNC: 2.2 MG/DL (ref 1.7–2.3)
MCH RBC QN AUTO: 29.4 PG (ref 26.5–33)
MCHC RBC AUTO-ENTMCNC: 31.5 G/DL (ref 31.5–36.5)
MCV RBC AUTO: 93 FL (ref 78–100)
PHOSPHATE SERPL-MCNC: 2.9 MG/DL (ref 2.5–4.5)
PLATELET # BLD AUTO: 164 10E3/UL (ref 150–450)
POTASSIUM SERPL-SCNC: 4.9 MMOL/L (ref 3.4–5.3)
RBC # BLD AUTO: 3.98 10E6/UL (ref 3.8–5.2)
SODIUM SERPL-SCNC: 145 MMOL/L (ref 135–145)
WBC # BLD AUTO: 6 10E3/UL (ref 4–11)

## 2025-08-09 PROCEDURE — 36415 COLL VENOUS BLD VENIPUNCTURE: CPT

## 2025-08-09 PROCEDURE — 83735 ASSAY OF MAGNESIUM: CPT

## 2025-08-09 PROCEDURE — 250N000013 HC RX MED GY IP 250 OP 250 PS 637

## 2025-08-09 PROCEDURE — 250N000013 HC RX MED GY IP 250 OP 250 PS 637: Performed by: NEUROLOGICAL SURGERY

## 2025-08-09 PROCEDURE — 84100 ASSAY OF PHOSPHORUS: CPT

## 2025-08-09 PROCEDURE — 80048 BASIC METABOLIC PNL TOTAL CA: CPT

## 2025-08-09 PROCEDURE — 85027 COMPLETE CBC AUTOMATED: CPT

## 2025-08-09 RX ORDER — NALOXONE HYDROCHLORIDE 0.4 MG/ML
0.2 INJECTION, SOLUTION INTRAMUSCULAR; INTRAVENOUS; SUBCUTANEOUS
Status: DISCONTINUED | OUTPATIENT
Start: 2025-08-09 | End: 2025-08-09 | Stop reason: HOSPADM

## 2025-08-09 RX ORDER — NALOXONE HYDROCHLORIDE 0.4 MG/ML
0.4 INJECTION, SOLUTION INTRAMUSCULAR; INTRAVENOUS; SUBCUTANEOUS
Status: DISCONTINUED | OUTPATIENT
Start: 2025-08-09 | End: 2025-08-09 | Stop reason: HOSPADM

## 2025-08-09 RX ADMIN — MEMANTINE 10 MG: 10 TABLET ORAL at 08:17

## 2025-08-09 RX ADMIN — Medication 1000 UNITS: at 08:18

## 2025-08-09 RX ADMIN — Medication 50 MG: at 08:16

## 2025-08-09 RX ADMIN — Medication 1 TABLET: at 08:17

## 2025-08-09 RX ADMIN — BACLOFEN 20 MG: 20 TABLET ORAL at 08:15

## 2025-08-09 RX ADMIN — LEVETIRACETAM 500 MG: 500 TABLET, FILM COATED ORAL at 08:17

## 2025-08-09 RX ADMIN — GABAPENTIN 800 MG: 400 CAPSULE ORAL at 08:16

## 2025-08-09 ASSESSMENT — ACTIVITIES OF DAILY LIVING (ADL)
ADLS_ACUITY_SCORE: 50
ADLS_ACUITY_SCORE: 49
ADLS_ACUITY_SCORE: 50
ADLS_ACUITY_SCORE: 50
ADLS_ACUITY_SCORE: 49
ADLS_ACUITY_SCORE: 50

## 2025-08-11 ENCOUNTER — TELEPHONE (OUTPATIENT)
Dept: NEUROSURGERY | Facility: CLINIC | Age: 65
End: 2025-08-11
Payer: MEDICARE

## 2025-08-28 ENCOUNTER — VIRTUAL VISIT (OUTPATIENT)
Dept: NEUROSURGERY | Facility: CLINIC | Age: 65
End: 2025-08-28
Payer: MEDICARE

## 2025-08-28 DIAGNOSIS — G50.0 TRIGEMINAL NEURALGIA: Primary | ICD-10-CM

## 2025-08-28 ASSESSMENT — PAIN SCALES - GENERAL: PAINLEVEL_OUTOF10: NO PAIN (0)

## (undated) DEVICE — GOWN XLG DISP 9545

## (undated) DEVICE — DRSG GAUZE 4X4" TRAY 6939

## (undated) DEVICE — LINEN TOWEL PACK X5 5464

## (undated) DEVICE — NDL 25GA 2"  8881200441

## (undated) DEVICE — GLOVE PROTEXIS MICRO 8.0  2D73PM80

## (undated) DEVICE — LABEL MEDICATION SYSTEM 3303-P

## (undated) DEVICE — CUP AND LID 2PK 2OZ STERILE  SSK9006A

## (undated) DEVICE — NDL 15GA 1.5" 8881200029

## (undated) DEVICE — DRSG BANDAID 1X3" FABRIC CURITY LATEX FREE KC44101

## (undated) DEVICE — PREP CHLORAPREP CLEAR 3ML 260400

## (undated) DEVICE — GLOVE PROTEXIS BLUE W/NEU-THERA 8.5  2D73EB85

## (undated) DEVICE — DRAPE MAYO STAND 23X54 8337

## (undated) DEVICE — PAD GROUND RHIZOLYSIS DISP DGP-PM-25

## (undated) DEVICE — SYR 10ML FINGER CONTROL W/O NDL 309695

## (undated) DEVICE — NDL COUNTER 20CT 31142493

## (undated) DEVICE — ADPT 5 IN 1 360

## (undated) RX ORDER — ONDANSETRON 2 MG/ML
INJECTION INTRAMUSCULAR; INTRAVENOUS
Status: DISPENSED
Start: 2020-03-09

## (undated) RX ORDER — PROPOFOL 10 MG/ML
INJECTION, EMULSION INTRAVENOUS
Status: DISPENSED
Start: 2020-03-09

## (undated) RX ORDER — PROPOFOL 10 MG/ML
INJECTION, EMULSION INTRAVENOUS
Status: DISPENSED
Start: 2018-02-15

## (undated) RX ORDER — CEFAZOLIN SODIUM 2 G/100ML
INJECTION, SOLUTION INTRAVENOUS
Status: DISPENSED
Start: 2018-02-15

## (undated) RX ORDER — CEFAZOLIN SODIUM 2 G/100ML
INJECTION, SOLUTION INTRAVENOUS
Status: DISPENSED
Start: 2020-03-09

## (undated) RX ORDER — LIDOCAINE HYDROCHLORIDE 10 MG/ML
INJECTION, SOLUTION EPIDURAL; INFILTRATION; INTRACAUDAL; PERINEURAL
Status: DISPENSED
Start: 2018-02-15

## (undated) RX ORDER — LIDOCAINE HYDROCHLORIDE 20 MG/ML
INJECTION, SOLUTION EPIDURAL; INFILTRATION; INTRACAUDAL; PERINEURAL
Status: DISPENSED
Start: 2020-03-09

## (undated) RX ORDER — ONDANSETRON 2 MG/ML
INJECTION INTRAMUSCULAR; INTRAVENOUS
Status: DISPENSED
Start: 2018-02-15

## (undated) RX ORDER — GLYCOPYRROLATE 0.2 MG/ML
INJECTION, SOLUTION INTRAMUSCULAR; INTRAVENOUS
Status: DISPENSED
Start: 2020-03-09

## (undated) RX ORDER — FENTANYL CITRATE 50 UG/ML
INJECTION, SOLUTION INTRAMUSCULAR; INTRAVENOUS
Status: DISPENSED
Start: 2018-02-15